# Patient Record
Sex: MALE | Race: WHITE | Employment: OTHER | URBAN - METROPOLITAN AREA
[De-identification: names, ages, dates, MRNs, and addresses within clinical notes are randomized per-mention and may not be internally consistent; named-entity substitution may affect disease eponyms.]

---

## 2017-01-17 ENCOUNTER — HOSPITAL ENCOUNTER (OUTPATIENT)
Dept: PHYSICAL THERAPY | Age: 52
Discharge: HOME | End: 2017-01-17
Payer: COMMERCIAL

## 2017-01-17 PROCEDURE — 97035 APP MDLTY 1+ULTRASOUND EA 15: CPT

## 2017-01-17 PROCEDURE — 97014 ELECTRIC STIMULATION THERAPY: CPT

## 2017-01-17 PROCEDURE — 97162 PT EVAL MOD COMPLEX 30 MIN: CPT
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
## 2017-01-18 NOTE — PROGRESS NOTES
Ambulatory/Rehab Services H2 Model Falls Risk Assessment    Risk Factor Pts. ·   Confusion/Disorientation/Impulsivity  []    4 ·   Symptomatic Depression  []   2 ·   Altered Elimination  []   1 ·   Dizziness/Vertigo  []   1 ·   Gender (Male)  [x]   1 ·   Any administered antiepileptics (anticonvulsants):  []   2 ·   Any administered benzodiazepines:  []   1 ·   Visual Impairment (specify):  []   1 ·   Portable Oxygen Use  []   1 ·   Orthostatic ? BP  []   1 ·   History of Recent Falls (within 3 mos.)  []   5     Ability to Rise from Chair (choose one) Pts. ·   Ability to rise in a single movement  []   0 ·   Pushes up, successful in one attempt  [x]   1 ·   Multiple attempts, but successful  []   3 ·   Unable to rise without assistance  []   4   Total: (5 or greater = High Risk) 2     Falls Prevention Plan:   []                Physical Limitations to Exercise (specify):   []                Mobility Assistance Device (type):   []                Exercise/Equipment Adaptation (specify):    ©2010 [facility] [address] Patent #[id number].  Federal Law prohibits the replication, distribution or use without written permission from [facility] [employer]

## 2017-01-19 ENCOUNTER — HOSPITAL ENCOUNTER (OUTPATIENT)
Dept: PHYSICAL THERAPY | Age: 52
Discharge: HOME | End: 2017-01-19
Payer: COMMERCIAL

## 2017-01-19 PROCEDURE — 97035 APP MDLTY 1+ULTRASOUND EA 15: CPT

## 2017-01-19 PROCEDURE — 97110 THERAPEUTIC EXERCISES: CPT

## 2017-01-19 PROCEDURE — 97014 ELECTRIC STIMULATION THERAPY: CPT
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
## 2017-01-24 ENCOUNTER — APPOINTMENT (OUTPATIENT)
Dept: PHYSICAL THERAPY | Age: 52
End: 2017-01-24
Payer: COMMERCIAL

## 2017-01-26 ENCOUNTER — HOSPITAL ENCOUNTER (OUTPATIENT)
Dept: PHYSICAL THERAPY | Age: 52
Discharge: HOME | End: 2017-01-26
Payer: COMMERCIAL

## 2017-01-26 PROCEDURE — 97110 THERAPEUTIC EXERCISES: CPT

## 2017-01-26 PROCEDURE — 97035 APP MDLTY 1+ULTRASOUND EA 15: CPT
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
## 2017-01-31 ENCOUNTER — HOSPITAL ENCOUNTER (OUTPATIENT)
Dept: PHYSICAL THERAPY | Age: 52
Discharge: HOME | End: 2017-01-31
Payer: COMMERCIAL

## 2017-01-31 PROCEDURE — 97140 MANUAL THERAPY 1/> REGIONS: CPT

## 2017-01-31 PROCEDURE — 97014 ELECTRIC STIMULATION THERAPY: CPT

## 2017-01-31 PROCEDURE — 97035 APP MDLTY 1+ULTRASOUND EA 15: CPT

## 2017-01-31 PROCEDURE — 97110 THERAPEUTIC EXERCISES: CPT
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
## 2017-02-02 ENCOUNTER — HOSPITAL ENCOUNTER (OUTPATIENT)
Dept: PHYSICAL THERAPY | Age: 52
Discharge: HOME | End: 2017-02-02
Payer: COMMERCIAL

## 2017-02-02 PROCEDURE — 97014 ELECTRIC STIMULATION THERAPY: CPT

## 2017-02-02 PROCEDURE — 97140 MANUAL THERAPY 1/> REGIONS: CPT

## 2017-02-02 PROCEDURE — 97110 THERAPEUTIC EXERCISES: CPT
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
## 2017-02-07 ENCOUNTER — HOSPITAL ENCOUNTER (OUTPATIENT)
Dept: PHYSICAL THERAPY | Age: 52
Discharge: HOME | End: 2017-02-07
Payer: COMMERCIAL

## 2017-02-07 PROCEDURE — 97035 APP MDLTY 1+ULTRASOUND EA 15: CPT

## 2017-02-07 PROCEDURE — 97140 MANUAL THERAPY 1/> REGIONS: CPT

## 2017-02-07 PROCEDURE — 97110 THERAPEUTIC EXERCISES: CPT

## 2017-02-07 PROCEDURE — 97014 ELECTRIC STIMULATION THERAPY: CPT
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
## 2017-02-09 ENCOUNTER — HOSPITAL ENCOUNTER (OUTPATIENT)
Dept: PHYSICAL THERAPY | Age: 52
Discharge: HOME | End: 2017-02-09
Payer: COMMERCIAL

## 2017-02-09 PROCEDURE — 97140 MANUAL THERAPY 1/> REGIONS: CPT

## 2017-02-09 PROCEDURE — 97014 ELECTRIC STIMULATION THERAPY: CPT

## 2017-02-09 PROCEDURE — 97110 THERAPEUTIC EXERCISES: CPT
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
## 2017-02-14 ENCOUNTER — HOSPITAL ENCOUNTER (OUTPATIENT)
Dept: PHYSICAL THERAPY | Age: 52
Discharge: HOME | End: 2017-02-14
Payer: COMMERCIAL

## 2017-02-16 ENCOUNTER — HOSPITAL ENCOUNTER (OUTPATIENT)
Dept: PHYSICAL THERAPY | Age: 52
Discharge: HOME | End: 2017-02-16
Payer: COMMERCIAL

## 2017-02-16 PROCEDURE — 97014 ELECTRIC STIMULATION THERAPY: CPT

## 2017-02-16 PROCEDURE — 97140 MANUAL THERAPY 1/> REGIONS: CPT

## 2017-02-16 PROCEDURE — 97110 THERAPEUTIC EXERCISES: CPT
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
## 2017-02-21 ENCOUNTER — APPOINTMENT (OUTPATIENT)
Dept: PHYSICAL THERAPY | Age: 52
End: 2017-02-21
Payer: COMMERCIAL

## 2017-02-23 ENCOUNTER — HOSPITAL ENCOUNTER (OUTPATIENT)
Dept: PHYSICAL THERAPY | Age: 52
Discharge: HOME | End: 2017-02-23
Payer: COMMERCIAL

## 2017-02-23 PROCEDURE — 97014 ELECTRIC STIMULATION THERAPY: CPT

## 2017-02-23 PROCEDURE — 97110 THERAPEUTIC EXERCISES: CPT

## 2017-02-23 PROCEDURE — 97035 APP MDLTY 1+ULTRASOUND EA 15: CPT
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
## 2017-02-28 ENCOUNTER — HOSPITAL ENCOUNTER (OUTPATIENT)
Dept: PHYSICAL THERAPY | Age: 52
Discharge: HOME | End: 2017-02-28
Payer: COMMERCIAL

## 2017-02-28 PROCEDURE — 97140 MANUAL THERAPY 1/> REGIONS: CPT

## 2017-02-28 PROCEDURE — 97014 ELECTRIC STIMULATION THERAPY: CPT

## 2017-02-28 PROCEDURE — 97110 THERAPEUTIC EXERCISES: CPT
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
## 2017-05-18 ENCOUNTER — APPOINTMENT (RX ONLY)
Dept: URBAN - METROPOLITAN AREA CLINIC 349 | Facility: CLINIC | Age: 52
Setting detail: DERMATOLOGY
End: 2017-05-18

## 2017-05-18 DIAGNOSIS — L81.4 OTHER MELANIN HYPERPIGMENTATION: ICD-10-CM | Status: STABLE

## 2017-05-18 DIAGNOSIS — B35.4 TINEA CORPORIS: ICD-10-CM | Status: INADEQUATELY CONTROLLED

## 2017-05-18 PROCEDURE — ? TREATMENT REGIMEN

## 2017-05-18 PROCEDURE — 99243 OFF/OP CNSLTJ NEW/EST LOW 30: CPT

## 2017-05-18 PROCEDURE — ? COUNSELING

## 2017-05-18 PROCEDURE — ? PRESCRIPTION

## 2017-05-18 RX ORDER — KETOCONAZOLE 20.5 MG/ML
SHAMPOO, SUSPENSION TOPICAL
Qty: 1 | Refills: 0 | Status: ERX | COMMUNITY
Start: 2017-05-18

## 2017-05-18 RX ORDER — TERBINAFINE HCL 250 MG
TABLET ORAL
Qty: 21 | Refills: 0 | Status: ERX | COMMUNITY
Start: 2017-05-18

## 2017-05-18 RX ADMIN — Medication: at 17:47

## 2017-05-18 RX ADMIN — KETOCONAZOLE: 20.5 SHAMPOO, SUSPENSION TOPICAL at 17:47

## 2017-05-18 ASSESSMENT — LOCATION DETAILED DESCRIPTION DERM
LOCATION DETAILED: RIGHT LATERAL ABDOMEN
LOCATION DETAILED: RIGHT SUPERIOR MEDIAL UPPER BACK
LOCATION DETAILED: LEFT POSTERIOR SHOULDER
LOCATION DETAILED: LEFT RIB CAGE
LOCATION DETAILED: RIGHT INFERIOR UPPER BACK
LOCATION DETAILED: RIGHT LATERAL UPPER BACK

## 2017-05-18 ASSESSMENT — LOCATION SIMPLE DESCRIPTION DERM
LOCATION SIMPLE: RIGHT UPPER BACK
LOCATION SIMPLE: LEFT SHOULDER
LOCATION SIMPLE: ABDOMEN

## 2017-05-18 ASSESSMENT — LOCATION ZONE DERM
LOCATION ZONE: TRUNK
LOCATION ZONE: ARM

## 2017-05-18 NOTE — PROCEDURE: TREATMENT REGIMEN
Initiate Treatment: Terbinafine 250 mg qd for 3 weeks, apply Ketoconazole  .1 % shampoo qd to torso daily for two weeks, then twice weekly there after.
Detail Level: Zone

## 2017-06-02 PROBLEM — R26.9 ABNORMALITY OF GAIT: Status: ACTIVE | Noted: 2017-06-02

## 2017-06-02 PROBLEM — M54.40 ACUTE LEFT-SIDED LOW BACK PAIN WITH SCIATICA: Status: ACTIVE | Noted: 2017-06-02

## 2017-06-02 PROBLEM — R29.898 WEAKNESS OF LEFT LEG: Status: ACTIVE | Noted: 2017-06-02

## 2017-06-02 PROBLEM — R32 URINARY INCONTINENCE: Status: ACTIVE | Noted: 2017-06-02

## 2017-06-21 ENCOUNTER — APPOINTMENT (RX ONLY)
Dept: URBAN - METROPOLITAN AREA CLINIC 349 | Facility: CLINIC | Age: 52
Setting detail: DERMATOLOGY
End: 2017-06-21

## 2017-06-21 DIAGNOSIS — B35.4 TINEA CORPORIS: ICD-10-CM | Status: IMPROVED

## 2017-06-21 DIAGNOSIS — L74.0 MILIARIA RUBRA: ICD-10-CM

## 2017-06-21 PROBLEM — L23.7 ALLERGIC CONTACT DERMATITIS DUE TO PLANTS, EXCEPT FOOD: Status: ACTIVE | Noted: 2017-06-21

## 2017-06-21 PROBLEM — L70.0 ACNE VULGARIS: Status: ACTIVE | Noted: 2017-06-21

## 2017-06-21 PROCEDURE — ? PRESCRIPTION

## 2017-06-21 PROCEDURE — ? COUNSELING

## 2017-06-21 PROCEDURE — ? TREATMENT REGIMEN

## 2017-06-21 PROCEDURE — 99213 OFFICE O/P EST LOW 20 MIN: CPT

## 2017-06-21 RX ORDER — MOMETASONE FUROATE 1 MG/G
CREAM TOPICAL
Qty: 1 | Refills: 2 | Status: ERX | COMMUNITY
Start: 2017-06-21

## 2017-06-21 RX ORDER — TERBINAFINE HCL 250 MG
TABLET ORAL
Qty: 14 | Refills: 0 | Status: ERX

## 2017-06-21 RX ADMIN — MOMETASONE FUROATE: 1 CREAM TOPICAL at 19:32

## 2017-06-21 ASSESSMENT — LOCATION SIMPLE DESCRIPTION DERM
LOCATION SIMPLE: LEFT UPPER BACK
LOCATION SIMPLE: RIGHT UPPER BACK

## 2017-06-21 ASSESSMENT — LOCATION ZONE DERM: LOCATION ZONE: TRUNK

## 2017-06-21 ASSESSMENT — LOCATION DETAILED DESCRIPTION DERM
LOCATION DETAILED: LEFT SUPERIOR UPPER BACK
LOCATION DETAILED: RIGHT MID-UPPER BACK

## 2017-06-21 NOTE — PROCEDURE: TREATMENT REGIMEN
Detail Level: Zone
Initiate Treatment: Use daily on affected area on back daily after bathing
Continue Regimen: Current regimen of Ketoconazole shampoo daily as body wash.  Continuing Terbinafin 250 mgs taking one daily for 2 more weeks

## 2017-12-11 PROBLEM — R25.1 TREMOR OF BOTH HANDS: Status: ACTIVE | Noted: 2017-12-11

## 2017-12-11 PROBLEM — R53.83 FATIGUE: Status: ACTIVE | Noted: 2017-12-11

## 2017-12-11 PROBLEM — Z82.0 FAMILY HISTORY OF PARKINSON DISEASE: Status: ACTIVE | Noted: 2017-12-11

## 2017-12-11 PROBLEM — R40.4 TRANSIENT ALTERATION OF AWARENESS: Status: ACTIVE | Noted: 2017-12-11

## 2017-12-14 PROBLEM — R41.3 MEMORY CHANGES: Status: ACTIVE | Noted: 2017-12-14

## 2019-02-18 ENCOUNTER — HOSPITAL ENCOUNTER (OUTPATIENT)
Dept: GENERAL RADIOLOGY | Age: 54
Discharge: HOME | End: 2019-02-18
Payer: COMMERCIAL

## 2019-02-18 DIAGNOSIS — R05.9 COUGH: ICD-10-CM

## 2019-02-18 DIAGNOSIS — J11.1 INFLUENZA: ICD-10-CM

## 2019-02-18 PROCEDURE — 71046 X-RAY EXAM CHEST 2 VIEWS: CPT

## 2022-03-18 PROBLEM — R25.1 TREMOR OF BOTH HANDS: Status: ACTIVE | Noted: 2017-12-11

## 2022-03-19 PROBLEM — R29.898 WEAKNESS OF LEFT LEG: Status: ACTIVE | Noted: 2017-06-02

## 2022-03-19 PROBLEM — R40.4 TRANSIENT ALTERATION OF AWARENESS: Status: ACTIVE | Noted: 2017-12-11

## 2022-03-19 PROBLEM — R32 URINARY INCONTINENCE: Status: ACTIVE | Noted: 2017-06-02

## 2022-03-19 PROBLEM — R26.9 ABNORMALITY OF GAIT: Status: ACTIVE | Noted: 2017-06-02

## 2022-03-19 PROBLEM — M54.40 ACUTE LEFT-SIDED LOW BACK PAIN WITH SCIATICA: Status: ACTIVE | Noted: 2017-06-02

## 2022-03-19 PROBLEM — R41.3 MEMORY CHANGES: Status: ACTIVE | Noted: 2017-12-14

## 2022-03-19 PROBLEM — R53.83 FATIGUE: Status: ACTIVE | Noted: 2017-12-11

## 2022-03-19 PROBLEM — Z82.0 FAMILY HISTORY OF PARKINSON DISEASE: Status: ACTIVE | Noted: 2017-12-11

## 2022-06-02 ENCOUNTER — TELEPHONE (OUTPATIENT)
Dept: FAMILY MEDICINE CLINIC | Facility: CLINIC | Age: 57
End: 2022-06-02

## 2022-06-02 NOTE — TELEPHONE ENCOUNTER
----- Message from Binu Davis sent at 6/2/2022  9:25 AM EDT -----  Subject: Appointment Request    Reason for Call: Semi-Routine Skin Problem    QUESTIONS  Type of Appointment? Established Patient  Reason for appointment request? Available appointments did not meet   patient need  Additional Information for Provider? Pt has a sore on the bottom of his   right foot, Pt would like to be seen in-person not VV. Pt will schedule   with any physician. Please reach out if Pt can be seen within the next   couple of days. ---------------------------------------------------------------------------  --------------  Ascencion Anna INFO  What is the best way for the office to contact you? OK to leave message on   voicemail  Preferred Call Back Phone Number? [phone]  ---------------------------------------------------------------------------  --------------  SCRIPT ANSWERS  Relationship to Patient? Self  Are you having swelling in your throat or face? No  Are you having difficulty breathing? No  Have the symptoms worsened or spread in the last day? No  Are you having fevers (100.4), chills or sweats? No  Have you recently (14 days) seen a provider for this issue? No  Have you been diagnosed with, awaiting test results for, or told that you   are suspected of having COVID-19 (Coronavirus)? (If patient has tested   negative or was tested as a requirement for work, school, or travel and   not based on symptoms, answer no)? No  Within the past 10 days have you developed any of the following symptoms   (answer no if symptoms have been present longer than 10 days or began   more than 10 days ago)? Fever or Chills, Cough, Shortness of breath or   difficulty breathing, Loss of taste or smell, Sore throat, Nasal   congestion, Sneezing or runny nose, Fatigue or generalized body aches   (answer no if pain is specific to a body part e.g. back pain), Diarrhea,   Headache?  No  Have you had close contact with someone with COVID-19 in the last 7 days? No  (Service Expert  click yes below to proceed with [employer] As Usual   Scheduling)?  Yes

## 2023-08-30 ENCOUNTER — OFFICE VISIT (OUTPATIENT)
Dept: FAMILY MEDICINE CLINIC | Facility: CLINIC | Age: 58
End: 2023-08-30
Payer: COMMERCIAL

## 2023-08-30 VITALS
TEMPERATURE: 97.2 F | HEIGHT: 71 IN | OXYGEN SATURATION: 98 % | WEIGHT: 193.25 LBS | BODY MASS INDEX: 27.06 KG/M2 | SYSTOLIC BLOOD PRESSURE: 130 MMHG | DIASTOLIC BLOOD PRESSURE: 80 MMHG | HEART RATE: 59 BPM

## 2023-08-30 DIAGNOSIS — J40 BRONCHITIS: Primary | ICD-10-CM

## 2023-08-30 PROCEDURE — 99213 OFFICE O/P EST LOW 20 MIN: CPT | Performed by: NURSE PRACTITIONER

## 2023-08-30 RX ORDER — METHYLPREDNISOLONE 4 MG/1
TABLET ORAL
Qty: 1 KIT | Refills: 0 | Status: SHIPPED | OUTPATIENT
Start: 2023-08-30 | End: 2023-09-05

## 2023-08-30 RX ORDER — AZITHROMYCIN 250 MG/1
250 TABLET, FILM COATED ORAL SEE ADMIN INSTRUCTIONS
Qty: 6 TABLET | Refills: 0 | Status: SHIPPED | OUTPATIENT
Start: 2023-08-30 | End: 2023-09-04

## 2023-08-30 RX ORDER — ALBUTEROL SULFATE 90 UG/1
2 AEROSOL, METERED RESPIRATORY (INHALATION) 4 TIMES DAILY PRN
Qty: 1 EACH | Refills: 0 | Status: SHIPPED | OUTPATIENT
Start: 2023-08-30

## 2023-08-30 SDOH — ECONOMIC STABILITY: FOOD INSECURITY: WITHIN THE PAST 12 MONTHS, THE FOOD YOU BOUGHT JUST DIDN'T LAST AND YOU DIDN'T HAVE MONEY TO GET MORE.: NEVER TRUE

## 2023-08-30 SDOH — ECONOMIC STABILITY: FOOD INSECURITY: WITHIN THE PAST 12 MONTHS, YOU WORRIED THAT YOUR FOOD WOULD RUN OUT BEFORE YOU GOT MONEY TO BUY MORE.: NEVER TRUE

## 2023-08-30 SDOH — ECONOMIC STABILITY: INCOME INSECURITY: HOW HARD IS IT FOR YOU TO PAY FOR THE VERY BASICS LIKE FOOD, HOUSING, MEDICAL CARE, AND HEATING?: NOT HARD AT ALL

## 2023-08-30 SDOH — ECONOMIC STABILITY: HOUSING INSECURITY
IN THE LAST 12 MONTHS, WAS THERE A TIME WHEN YOU DID NOT HAVE A STEADY PLACE TO SLEEP OR SLEPT IN A SHELTER (INCLUDING NOW)?: NO

## 2023-08-30 ASSESSMENT — ENCOUNTER SYMPTOMS
COUGH: 1
HEMOPTYSIS: 0
SHORTNESS OF BREATH: 1
HEARTBURN: 0
WHEEZING: 1

## 2023-08-30 ASSESSMENT — PATIENT HEALTH QUESTIONNAIRE - PHQ9
SUM OF ALL RESPONSES TO PHQ QUESTIONS 1-9: 0
SUM OF ALL RESPONSES TO PHQ QUESTIONS 1-9: 0
1. LITTLE INTEREST OR PLEASURE IN DOING THINGS: 0
2. FEELING DOWN, DEPRESSED OR HOPELESS: 0
SUM OF ALL RESPONSES TO PHQ QUESTIONS 1-9: 0
SUM OF ALL RESPONSES TO PHQ QUESTIONS 1-9: 0
SUM OF ALL RESPONSES TO PHQ9 QUESTIONS 1 & 2: 0

## 2023-08-30 NOTE — PROGRESS NOTES
[address], [address] [place] [address]  Phone [phone]  Fax:  [phone]    Patient: Serina Dill  YOB: 1965  Patient Age 66 y.o. Patient sex: male  Medical Record:  [id number]  Visit Date: 08/30/23    [facility] Clinic Note     Chief Complaint   Patient presents with    Wheezing     SOB, prod cough, head congestion       History of Present Illness:  Mr. Britany Benito is a pleasant 66year old male with a past medical history as noted below who presents for an acute visit. Patient states that he has been experiencing productive cough, nasal congestion, wheezing, shortness of breath that started approximately 2 months ago. Symptoms have been waxing and waning. He denies fever or chills. No myalgias. No bloody nasal discharge. Has had occasional shortness of breath, night time wheezing, no hemoptysis. Denies any sick contacts. Denies recent travel. Is a retired . Cough  This is a new problem. The current episode started more than 1 month ago. The problem has been waxing and waning. The problem occurs every few minutes. The cough is Productive of sputum. Associated symptoms include nasal congestion, postnasal drip, shortness of breath and wheezing. Pertinent negatives include no chest pain, chills, ear congestion, ear pain, fever, headaches, heartburn, hemoptysis or myalgias. Nothing aggravates the symptoms. He has tried OTC cough suppressant for the symptoms. The treatment provided mild relief. Allergies:   Allergies   Allergen Reactions    Penicillins Rash       Current Medications:   Medications marked \"taking\" at this time:    Current Outpatient Medications:     albuterol sulfate HFA (VENTOLIN HFA) 108 (90 Base) MCG/ACT inhaler, Inhale 2 puffs into the lungs 4 times daily as needed for Wheezing, Disp: 1 each, Rfl: 0    methylPREDNISolone (MEDROL DOSEPACK) 4 MG tablet, Take by mouth., Disp: 1 kit, Rfl: 0    azithromycin (ZITHROMAX) 250

## 2023-09-27 ENCOUNTER — TELEPHONE (OUTPATIENT)
Dept: FAMILY MEDICINE CLINIC | Facility: CLINIC | Age: 58
End: 2023-09-27

## 2023-09-27 DIAGNOSIS — R05.3 CHRONIC COUGH: Primary | ICD-10-CM

## 2023-09-27 NOTE — TELEPHONE ENCOUNTER
Pt would like a referral to a pulmonologist.   Pt was seen on 08/30 by Chriss Earl. Pt was given an antibiotic and   steroids. Pt began feeling bad again and went to [facility] urgent care in   [address] [place] [address] on 09/24. They are recommending pt see a pulmonologist.   Please call pt with information. please call wife Abner Givens she is on the   DENIS.

## 2023-10-20 ENCOUNTER — OFFICE VISIT (OUTPATIENT)
Dept: PULMONOLOGY | Age: 58
End: 2023-10-20
Payer: COMMERCIAL

## 2023-10-20 VITALS
HEIGHT: 71 IN | SYSTOLIC BLOOD PRESSURE: 132 MMHG | HEART RATE: 103 BPM | DIASTOLIC BLOOD PRESSURE: 84 MMHG | WEIGHT: 187 LBS | BODY MASS INDEX: 26.18 KG/M2 | RESPIRATION RATE: 18 BRPM | OXYGEN SATURATION: 98 % | TEMPERATURE: 97.3 F

## 2023-10-20 DIAGNOSIS — R06.2 WHEEZING: ICD-10-CM

## 2023-10-20 DIAGNOSIS — R05.3 CHRONIC COUGH: Primary | ICD-10-CM

## 2023-10-20 DIAGNOSIS — R63.4 UNINTENTIONAL WEIGHT LOSS: ICD-10-CM

## 2023-10-20 PROCEDURE — 99204 OFFICE O/P NEW MOD 45 MIN: CPT | Performed by: NURSE PRACTITIONER

## 2023-10-20 RX ORDER — PREDNISONE 10 MG/1
TABLET ORAL
Qty: 30 TABLET | Refills: 0 | Status: SHIPPED | OUTPATIENT
Start: 2023-10-20

## 2023-10-20 RX ORDER — BENZONATATE 200 MG/1
200 CAPSULE ORAL 3 TIMES DAILY PRN
Qty: 90 CAPSULE | Refills: 3 | Status: SHIPPED | OUTPATIENT
Start: 2023-10-20

## 2023-10-20 RX ORDER — ALBUTEROL SULFATE 2.5 MG/3ML
2.5 SOLUTION RESPIRATORY (INHALATION) EVERY 6 HOURS PRN
Qty: 120 EACH | Refills: 3 | Status: SHIPPED | OUTPATIENT
Start: 2023-10-20

## 2023-10-20 RX ORDER — BUDESONIDE AND FORMOTEROL FUMARATE DIHYDRATE 160; 4.5 UG/1; UG/1
2 AEROSOL RESPIRATORY (INHALATION) 2 TIMES DAILY
Qty: 1 EACH | Refills: 11 | Status: SHIPPED | OUTPATIENT
Start: 2023-10-20

## 2023-10-20 RX ORDER — CETIRIZINE HYDROCHLORIDE 10 MG/1
10 TABLET ORAL DAILY
COMMUNITY
Start: 2023-09-24

## 2023-10-30 ENCOUNTER — HOSPITAL ENCOUNTER (OUTPATIENT)
Dept: CT IMAGING | Age: 58
Discharge: HOME | End: 2023-11-02
Payer: COMMERCIAL

## 2023-10-30 DIAGNOSIS — R05.3 CHRONIC COUGH: ICD-10-CM

## 2023-10-30 PROCEDURE — 71250 CT THORAX DX C-: CPT

## 2023-11-13 ENCOUNTER — APPOINTMENT (OUTPATIENT)
Dept: CT IMAGING | Age: 58
End: 2023-11-13
Payer: COMMERCIAL

## 2023-11-13 ENCOUNTER — HOSPITAL ENCOUNTER (OUTPATIENT)
Age: 58
Setting detail: OBSERVATION
Discharge: HOME | End: 2023-11-15
Attending: EMERGENCY MEDICINE | Admitting: HOSPITALIST
Payer: COMMERCIAL

## 2023-11-13 ENCOUNTER — APPOINTMENT (OUTPATIENT)
Dept: GENERAL RADIOLOGY | Age: 58
End: 2023-11-13
Payer: COMMERCIAL

## 2023-11-13 DIAGNOSIS — R06.02 SHORTNESS OF BREATH: ICD-10-CM

## 2023-11-13 DIAGNOSIS — R06.2 WHEEZING: Primary | ICD-10-CM

## 2023-11-13 DIAGNOSIS — R05.2 SUBACUTE COUGH: ICD-10-CM

## 2023-11-13 PROBLEM — R05.3 CHRONIC COUGH: Status: ACTIVE | Noted: 2023-11-13

## 2023-11-13 PROBLEM — R07.9 CHEST PAIN: Status: ACTIVE | Noted: 2023-11-13

## 2023-11-13 LAB
ALBUMIN SERPL-MCNC: 4.2 G/DL (ref 3.5–5)
ALBUMIN/GLOB SERPL: 1.3 (ref 0.4–1.6)
ALP SERPL-CCNC: 73 U/L (ref 50–136)
ALT SERPL-CCNC: 50 U/L (ref 12–65)
ANION GAP SERPL CALC-SCNC: 5 MMOL/L (ref 2–11)
AST SERPL-CCNC: 23 U/L (ref 15–37)
B PERT DNA SPEC QL NAA+PROBE: NOT DETECTED
BASOPHILS # BLD: 0 K/UL (ref 0–0.2)
BASOPHILS NFR BLD: 1 % (ref 0–2)
BILIRUB SERPL-MCNC: 0.3 MG/DL (ref 0.2–1.1)
BORDETELLA PARAPERTUSSIS BY PCR: NOT DETECTED
BUN SERPL-MCNC: 14 MG/DL (ref 6–23)
C PNEUM DNA SPEC QL NAA+PROBE: NOT DETECTED
CALCIUM SERPL-MCNC: 9.2 MG/DL (ref 8.3–10.4)
CHLORIDE SERPL-SCNC: 105 MMOL/L (ref 101–110)
CO2 SERPL-SCNC: 30 MMOL/L (ref 21–32)
CREAT SERPL-MCNC: 1.2 MG/DL (ref 0.8–1.5)
DIFFERENTIAL METHOD BLD: ABNORMAL
EKG ATRIAL RATE: 79 BPM
EKG DIAGNOSIS: NORMAL
EKG P AXIS: 81 DEGREES
EKG P-R INTERVAL: 148 MS
EKG Q-T INTERVAL: 346 MS
EKG QRS DURATION: 84 MS
EKG QTC CALCULATION (BAZETT): 396 MS
EKG R AXIS: 71 DEGREES
EKG T AXIS: 62 DEGREES
EKG VENTRICULAR RATE: 79 BPM
EOSINOPHIL # BLD: 0.5 K/UL (ref 0–0.8)
EOSINOPHIL NFR BLD: 11 % (ref 0.5–7.8)
ERYTHROCYTE [DISTWIDTH] IN BLOOD BY AUTOMATED COUNT: 14.6 % (ref 11.9–14.6)
FLUAV SUBTYP SPEC NAA+PROBE: NOT DETECTED
FLUBV RNA SPEC QL NAA+PROBE: NOT DETECTED
GLOBULIN SER CALC-MCNC: 3.2 G/DL (ref 2.8–4.5)
GLUCOSE SERPL-MCNC: 94 MG/DL (ref 65–100)
HADV DNA SPEC QL NAA+PROBE: NOT DETECTED
HCOV 229E RNA SPEC QL NAA+PROBE: NOT DETECTED
HCOV HKU1 RNA SPEC QL NAA+PROBE: NOT DETECTED
HCOV NL63 RNA SPEC QL NAA+PROBE: NOT DETECTED
HCOV OC43 RNA SPEC QL NAA+PROBE: NOT DETECTED
HCT VFR BLD AUTO: 47.3 % (ref 41.1–50.3)
HGB BLD-MCNC: 15.9 G/DL (ref 13.6–17.2)
HMPV RNA SPEC QL NAA+PROBE: NOT DETECTED
HPIV1 RNA SPEC QL NAA+PROBE: NOT DETECTED
HPIV2 RNA SPEC QL NAA+PROBE: NOT DETECTED
HPIV3 RNA SPEC QL NAA+PROBE: NOT DETECTED
HPIV4 RNA SPEC QL NAA+PROBE: NOT DETECTED
IMM GRANULOCYTES # BLD AUTO: 0 K/UL (ref 0–0.5)
IMM GRANULOCYTES NFR BLD AUTO: 0 % (ref 0–5)
LACTATE SERPL-SCNC: 1.5 MMOL/L (ref 0.4–2)
LYMPHOCYTES # BLD: 1.2 K/UL (ref 0.5–4.6)
LYMPHOCYTES NFR BLD: 24 % (ref 13–44)
M PNEUMO DNA SPEC QL NAA+PROBE: NOT DETECTED
MAGNESIUM SERPL-MCNC: 2.6 MG/DL (ref 1.8–2.4)
MCH RBC QN AUTO: 30.1 PG (ref 26.1–32.9)
MCHC RBC AUTO-ENTMCNC: 33.6 G/DL (ref 31.4–35)
MCV RBC AUTO: 89.4 FL (ref 82–102)
MONOCYTES # BLD: 0.3 K/UL (ref 0.1–1.3)
MONOCYTES NFR BLD: 6 % (ref 4–12)
NEUTS SEG # BLD: 2.9 K/UL (ref 1.7–8.2)
NEUTS SEG NFR BLD: 58 % (ref 43–78)
NRBC # BLD: 0 K/UL (ref 0–0.2)
PLATELET # BLD AUTO: 244 K/UL (ref 150–450)
PMV BLD AUTO: 9.9 FL (ref 9.4–12.3)
POTASSIUM SERPL-SCNC: 3.9 MMOL/L (ref 3.5–5.1)
PROCALCITONIN SERPL-MCNC: <0.05 NG/ML (ref 0–0.49)
PROT SERPL-MCNC: 7.4 G/DL (ref 6.3–8.2)
RBC # BLD AUTO: 5.29 M/UL (ref 4.23–5.6)
RSV RNA SPEC QL NAA+PROBE: NOT DETECTED
RV+EV RNA SPEC QL NAA+PROBE: NOT DETECTED
SARS-COV-2 RNA RESP QL NAA+PROBE: NOT DETECTED
SODIUM SERPL-SCNC: 140 MMOL/L (ref 133–143)
WBC # BLD AUTO: 5 K/UL (ref 4.3–11.1)

## 2023-11-13 PROCEDURE — 71260 CT THORAX DX C+: CPT

## 2023-11-13 PROCEDURE — 93010 ELECTROCARDIOGRAM REPORT: CPT | Performed by: INTERNAL MEDICINE

## 2023-11-13 PROCEDURE — 84145 PROCALCITONIN (PCT): CPT

## 2023-11-13 PROCEDURE — 71045 X-RAY EXAM CHEST 1 VIEW: CPT

## 2023-11-13 PROCEDURE — 84484 ASSAY OF TROPONIN QUANT: CPT

## 2023-11-13 PROCEDURE — 6360000004 HC RX CONTRAST MEDICATION: Performed by: EMERGENCY MEDICINE

## 2023-11-13 PROCEDURE — 83605 ASSAY OF LACTIC ACID: CPT

## 2023-11-13 PROCEDURE — 96374 THER/PROPH/DIAG INJ IV PUSH: CPT

## 2023-11-13 PROCEDURE — 85025 COMPLETE CBC W/AUTO DIFF WBC: CPT

## 2023-11-13 PROCEDURE — 99285 EMERGENCY DEPT VISIT HI MDM: CPT

## 2023-11-13 PROCEDURE — 0202U NFCT DS 22 TRGT SARS-COV-2: CPT

## 2023-11-13 PROCEDURE — 80053 COMPREHEN METABOLIC PANEL: CPT

## 2023-11-13 PROCEDURE — 94640 AIRWAY INHALATION TREATMENT: CPT

## 2023-11-13 PROCEDURE — 6360000002 HC RX W HCPCS: Performed by: EMERGENCY MEDICINE

## 2023-11-13 PROCEDURE — 93005 ELECTROCARDIOGRAM TRACING: CPT | Performed by: EMERGENCY MEDICINE

## 2023-11-13 PROCEDURE — 94644 CONT INHLJ TX 1ST HOUR: CPT

## 2023-11-13 PROCEDURE — 83735 ASSAY OF MAGNESIUM: CPT

## 2023-11-13 PROCEDURE — 6370000000 HC RX 637 (ALT 250 FOR IP): Performed by: EMERGENCY MEDICINE

## 2023-11-13 PROCEDURE — 87040 BLOOD CULTURE FOR BACTERIA: CPT

## 2023-11-13 RX ORDER — SODIUM CHLORIDE 0.9 % (FLUSH) 0.9 %
10 SYRINGE (ML) INJECTION
Status: DISCONTINUED | OUTPATIENT
Start: 2023-11-13 | End: 2023-11-15 | Stop reason: HOSPADM

## 2023-11-13 RX ORDER — 0.9 % SODIUM CHLORIDE 0.9 %
100 INTRAVENOUS SOLUTION INTRAVENOUS ONCE
Status: DISCONTINUED | OUTPATIENT
Start: 2023-11-13 | End: 2023-11-15 | Stop reason: HOSPADM

## 2023-11-13 RX ORDER — ALBUTEROL SULFATE 2.5 MG/3ML
10 SOLUTION RESPIRATORY (INHALATION) ONCE
Status: COMPLETED | OUTPATIENT
Start: 2023-11-13 | End: 2023-11-13

## 2023-11-13 RX ORDER — IPRATROPIUM BROMIDE AND ALBUTEROL SULFATE 2.5; .5 MG/3ML; MG/3ML
1 SOLUTION RESPIRATORY (INHALATION) ONCE
Status: COMPLETED | OUTPATIENT
Start: 2023-11-13 | End: 2023-11-13

## 2023-11-13 RX ADMIN — ALBUTEROL SULFATE 10 MG/HR: 2.5 SOLUTION RESPIRATORY (INHALATION) at 22:34

## 2023-11-13 RX ADMIN — METHYLPREDNISOLONE SODIUM SUCCINATE 125 MG: 125 INJECTION, POWDER, FOR SOLUTION INTRAMUSCULAR; INTRAVENOUS at 21:58

## 2023-11-13 RX ADMIN — IOPAMIDOL 100 ML: 755 INJECTION, SOLUTION INTRAVENOUS at 22:07

## 2023-11-13 RX ADMIN — IPRATROPIUM BROMIDE AND ALBUTEROL SULFATE 1 DOSE: 2.5; .5 SOLUTION RESPIRATORY (INHALATION) at 22:17

## 2023-11-13 ASSESSMENT — PAIN SCALES - GENERAL: PAINLEVEL_OUTOF10: 7

## 2023-11-13 ASSESSMENT — PAIN - FUNCTIONAL ASSESSMENT: PAIN_FUNCTIONAL_ASSESSMENT: 0-10

## 2023-11-14 ENCOUNTER — APPOINTMENT (OUTPATIENT)
Dept: NON INVASIVE DIAGNOSTICS | Age: 58
End: 2023-11-14
Attending: HOSPITALIST
Payer: COMMERCIAL

## 2023-11-14 PROBLEM — R06.2 WHEEZING: Status: ACTIVE | Noted: 2023-11-14

## 2023-11-14 PROBLEM — R05.2 SUBACUTE COUGH: Status: ACTIVE | Noted: 2023-11-13

## 2023-11-14 LAB
BASOPHILS # BLD: 0 K/UL (ref 0–0.2)
BASOPHILS NFR BLD: 0 % (ref 0–2)
DIFFERENTIAL METHOD BLD: ABNORMAL
ECHO AO ASC DIAM: 2.8 CM
ECHO AO ASCENDING AORTA INDEX: 1.37 CM/M2
ECHO AO ROOT DIAM: 2.8 CM
ECHO AO ROOT INDEX: 1.37 CM/M2
ECHO AV AREA PEAK VELOCITY: 3.6 CM2
ECHO AV AREA VTI: 4 CM2
ECHO AV AREA/BSA PEAK VELOCITY: 1.8 CM2/M2
ECHO AV AREA/BSA VTI: 2 CM2/M2
ECHO AV MEAN GRADIENT: 12 MMHG
ECHO AV MEAN VELOCITY: 1.7 M/S
ECHO AV PEAK GRADIENT: 16 MMHG
ECHO AV PEAK VELOCITY: 2 M/S
ECHO AV VELOCITY RATIO: 0.8
ECHO AV VTI: 34.6 CM
ECHO BSA: 2.09 M2
ECHO LA AREA 2C: 16.9 CM2
ECHO LA AREA 4C: 12 CM2
ECHO LA DIAMETER INDEX: 1.52 CM/M2
ECHO LA DIAMETER: 3.1 CM
ECHO LA MAJOR AXIS: 4.3 CM
ECHO LA MINOR AXIS: 4.6 CM
ECHO LA TO AORTIC ROOT RATIO: 1.11
ECHO LA VOL BP: 37 ML (ref 18–58)
ECHO LA VOL MOD A2C: 50 ML (ref 18–58)
ECHO LA VOL MOD A4C: 26 ML (ref 18–58)
ECHO LA VOL/BSA BIPLANE: 18 ML/M2 (ref 16–34)
ECHO LA VOLUME INDEX MOD A2C: 25 ML/M2 (ref 16–34)
ECHO LA VOLUME INDEX MOD A4C: 13 ML/M2 (ref 16–34)
ECHO LV E' LATERAL VELOCITY: 9 CM/S
ECHO LV E' SEPTAL VELOCITY: 10 CM/S
ECHO LV FRACTIONAL SHORTENING: 47 % (ref 28–44)
ECHO LV INTERNAL DIMENSION DIASTOLE INDEX: 2.11 CM/M2
ECHO LV INTERNAL DIMENSION DIASTOLIC: 4.3 CM (ref 4.2–5.9)
ECHO LV INTERNAL DIMENSION SYSTOLIC INDEX: 1.13 CM/M2
ECHO LV INTERNAL DIMENSION SYSTOLIC: 2.3 CM
ECHO LV IVSD: 1.3 CM (ref 0.6–1)
ECHO LV MASS 2D: 173.6 G (ref 88–224)
ECHO LV MASS INDEX 2D: 85.1 G/M2 (ref 49–115)
ECHO LV POSTERIOR WALL DIASTOLIC: 1 CM (ref 0.6–1)
ECHO LV RELATIVE WALL THICKNESS RATIO: 0.47
ECHO LVOT AREA: 4.5 CM2
ECHO LVOT AV VTI INDEX: 0.89
ECHO LVOT DIAM: 2.4 CM
ECHO LVOT MEAN GRADIENT: 6 MMHG
ECHO LVOT PEAK GRADIENT: 10 MMHG
ECHO LVOT PEAK VELOCITY: 1.6 M/S
ECHO LVOT STROKE VOLUME INDEX: 68.5 ML/M2
ECHO LVOT SV: 139.7 ML
ECHO LVOT VTI: 30.9 CM
ECHO MV A VELOCITY: 1.54 M/S
ECHO MV AREA VTI: 5.8 CM2
ECHO MV E DECELERATION TIME (DT): 143 MS
ECHO MV E VELOCITY: 1.01 M/S
ECHO MV E/A RATIO: 0.66
ECHO MV E/E' LATERAL: 11.22
ECHO MV E/E' RATIO (AVERAGED): 10.66
ECHO MV LVOT VTI INDEX: 0.77
ECHO MV MAX VELOCITY: 1.6 M/S
ECHO MV MEAN GRADIENT: 4 MMHG
ECHO MV MEAN VELOCITY: 1 M/S
ECHO MV PEAK GRADIENT: 10 MMHG
ECHO MV VTI: 23.9 CM
ECHO PV MAX VELOCITY: 1.6 M/S
ECHO PV PEAK GRADIENT: 10 MMHG
ECHO RV FREE WALL PEAK S': 38 CM/S
ECHO RV INTERNAL DIMENSION: 3.3 CM
ECHO RV TAPSE: 3.7 CM (ref 1.7–?)
ECHO TV REGURGITANT MAX VELOCITY: 3.01 M/S
ECHO TV REGURGITANT PEAK GRADIENT: 36 MMHG
EOSINOPHIL # BLD: 0 K/UL (ref 0–0.8)
EOSINOPHIL NFR BLD: 0 % (ref 0.5–7.8)
ERYTHROCYTE [DISTWIDTH] IN BLOOD BY AUTOMATED COUNT: 14.7 % (ref 11.9–14.6)
HCT VFR BLD AUTO: 44.2 % (ref 41.1–50.3)
HGB BLD-MCNC: 14.9 G/DL (ref 13.6–17.2)
IMM GRANULOCYTES # BLD AUTO: 0 K/UL (ref 0–0.5)
IMM GRANULOCYTES NFR BLD AUTO: 0 % (ref 0–5)
LYMPHOCYTES # BLD: 0.2 K/UL (ref 0.5–4.6)
LYMPHOCYTES NFR BLD: 4 % (ref 13–44)
MCH RBC QN AUTO: 30.3 PG (ref 26.1–32.9)
MCHC RBC AUTO-ENTMCNC: 33.7 G/DL (ref 31.4–35)
MCV RBC AUTO: 89.8 FL (ref 82–102)
MONOCYTES # BLD: 0 K/UL (ref 0.1–1.3)
MONOCYTES NFR BLD: 1 % (ref 4–12)
NEUTS SEG # BLD: 4.5 K/UL (ref 1.7–8.2)
NEUTS SEG NFR BLD: 95 % (ref 43–78)
NRBC # BLD: 0 K/UL (ref 0–0.2)
PLATELET # BLD AUTO: 251 K/UL (ref 150–450)
PLATELET COMMENT: ADEQUATE
PMV BLD AUTO: 10.8 FL (ref 9.4–12.3)
RBC # BLD AUTO: 4.92 M/UL (ref 4.23–5.6)
RBC MORPH BLD: ABNORMAL
TROPONIN I SERPL HS-MCNC: 15.1 PG/ML (ref 0–14)
TROPONIN I SERPL HS-MCNC: 17.8 PG/ML (ref 0–14)
WBC # BLD AUTO: 4.7 K/UL (ref 4.3–11.1)
WBC MORPH BLD: ABNORMAL

## 2023-11-14 PROCEDURE — 6360000002 HC RX W HCPCS: Performed by: PHYSICIAN ASSISTANT

## 2023-11-14 PROCEDURE — 83516 IMMUNOASSAY NONANTIBODY: CPT

## 2023-11-14 PROCEDURE — 6370000000 HC RX 637 (ALT 250 FOR IP): Performed by: STUDENT IN AN ORGANIZED HEALTH CARE EDUCATION/TRAINING PROGRAM

## 2023-11-14 PROCEDURE — G0378 HOSPITAL OBSERVATION PER HR: HCPCS

## 2023-11-14 PROCEDURE — 87206 SMEAR FLUORESCENT/ACID STAI: CPT

## 2023-11-14 PROCEDURE — 94640 AIRWAY INHALATION TREATMENT: CPT

## 2023-11-14 PROCEDURE — C8929 TTE W OR WO FOL WCON,DOPPLER: HCPCS

## 2023-11-14 PROCEDURE — 96376 TX/PRO/DX INJ SAME DRUG ADON: CPT

## 2023-11-14 PROCEDURE — 36415 COLL VENOUS BLD VENIPUNCTURE: CPT

## 2023-11-14 PROCEDURE — 87070 CULTURE OTHR SPECIMN AEROBIC: CPT

## 2023-11-14 PROCEDURE — 2580000003 HC RX 258: Performed by: INTERNAL MEDICINE

## 2023-11-14 PROCEDURE — 87116 MYCOBACTERIA CULTURE: CPT

## 2023-11-14 PROCEDURE — 93306 TTE W/DOPPLER COMPLETE: CPT | Performed by: INTERNAL MEDICINE

## 2023-11-14 PROCEDURE — 99223 1ST HOSP IP/OBS HIGH 75: CPT | Performed by: INTERNAL MEDICINE

## 2023-11-14 PROCEDURE — 94760 N-INVAS EAR/PLS OXIMETRY 1: CPT

## 2023-11-14 PROCEDURE — 6370000000 HC RX 637 (ALT 250 FOR IP): Performed by: PHYSICIAN ASSISTANT

## 2023-11-14 PROCEDURE — 6370000000 HC RX 637 (ALT 250 FOR IP): Performed by: HOSPITALIST

## 2023-11-14 PROCEDURE — 87205 SMEAR GRAM STAIN: CPT

## 2023-11-14 PROCEDURE — 94664 DEMO&/EVAL PT USE INHALER: CPT

## 2023-11-14 PROCEDURE — 2580000003 HC RX 258: Performed by: HOSPITALIST

## 2023-11-14 PROCEDURE — 85025 COMPLETE CBC W/AUTO DIFF WBC: CPT

## 2023-11-14 PROCEDURE — 86037 ANCA TITER EACH ANTIBODY: CPT

## 2023-11-14 PROCEDURE — 6360000002 HC RX W HCPCS: Performed by: HOSPITALIST

## 2023-11-14 PROCEDURE — 87040 BLOOD CULTURE FOR BACTERIA: CPT

## 2023-11-14 PROCEDURE — 2580000003 HC RX 258: Performed by: PHYSICIAN ASSISTANT

## 2023-11-14 PROCEDURE — 2700000000 HC OXYGEN THERAPY PER DAY

## 2023-11-14 PROCEDURE — 82785 ASSAY OF IGE: CPT

## 2023-11-14 PROCEDURE — 94761 N-INVAS EAR/PLS OXIMETRY MLT: CPT

## 2023-11-14 PROCEDURE — 6360000004 HC RX CONTRAST MEDICATION: Performed by: INTERNAL MEDICINE

## 2023-11-14 RX ORDER — FLUTICASONE PROPIONATE 50 MCG
2 SPRAY, SUSPENSION (ML) NASAL DAILY
Status: DISCONTINUED | OUTPATIENT
Start: 2023-11-14 | End: 2023-11-15 | Stop reason: HOSPADM

## 2023-11-14 RX ORDER — MAGNESIUM SULFATE IN WATER 40 MG/ML
2000 INJECTION, SOLUTION INTRAVENOUS PRN
Status: DISCONTINUED | OUTPATIENT
Start: 2023-11-14 | End: 2023-11-15 | Stop reason: HOSPADM

## 2023-11-14 RX ORDER — MONTELUKAST SODIUM 10 MG/1
10 TABLET ORAL NIGHTLY
Status: DISCONTINUED | OUTPATIENT
Start: 2023-11-14 | End: 2023-11-15 | Stop reason: HOSPADM

## 2023-11-14 RX ORDER — SODIUM CHLORIDE 0.9 % (FLUSH) 0.9 %
5-40 SYRINGE (ML) INJECTION PRN
Status: DISCONTINUED | OUTPATIENT
Start: 2023-11-14 | End: 2023-11-15 | Stop reason: HOSPADM

## 2023-11-14 RX ORDER — ALBUTEROL SULFATE 2.5 MG/3ML
2.5 SOLUTION RESPIRATORY (INHALATION)
Status: DISCONTINUED | OUTPATIENT
Start: 2023-11-14 | End: 2023-11-15 | Stop reason: HOSPADM

## 2023-11-14 RX ORDER — ENOXAPARIN SODIUM 100 MG/ML
40 INJECTION SUBCUTANEOUS DAILY
Status: DISCONTINUED | OUTPATIENT
Start: 2023-11-14 | End: 2023-11-15 | Stop reason: HOSPADM

## 2023-11-14 RX ORDER — OXYCODONE HYDROCHLORIDE 5 MG/1
5 TABLET ORAL EVERY 4 HOURS PRN
Status: DISCONTINUED | OUTPATIENT
Start: 2023-11-14 | End: 2023-11-15 | Stop reason: HOSPADM

## 2023-11-14 RX ORDER — SODIUM CHLORIDE 9 MG/ML
INJECTION, SOLUTION INTRAVENOUS PRN
Status: DISCONTINUED | OUTPATIENT
Start: 2023-11-14 | End: 2023-11-15 | Stop reason: HOSPADM

## 2023-11-14 RX ORDER — CETIRIZINE HYDROCHLORIDE 10 MG/1
10 TABLET ORAL DAILY
Status: DISCONTINUED | OUTPATIENT
Start: 2023-11-14 | End: 2023-11-15 | Stop reason: HOSPADM

## 2023-11-14 RX ORDER — POLYETHYLENE GLYCOL 3350 17 G/17G
17 POWDER, FOR SOLUTION ORAL DAILY PRN
Status: DISCONTINUED | OUTPATIENT
Start: 2023-11-14 | End: 2023-11-15 | Stop reason: HOSPADM

## 2023-11-14 RX ORDER — POTASSIUM CHLORIDE 7.45 MG/ML
10 INJECTION INTRAVENOUS PRN
Status: DISCONTINUED | OUTPATIENT
Start: 2023-11-14 | End: 2023-11-15 | Stop reason: HOSPADM

## 2023-11-14 RX ORDER — ONDANSETRON 2 MG/ML
4 INJECTION INTRAMUSCULAR; INTRAVENOUS EVERY 6 HOURS PRN
Status: DISCONTINUED | OUTPATIENT
Start: 2023-11-14 | End: 2023-11-15 | Stop reason: HOSPADM

## 2023-11-14 RX ORDER — DOXYCYCLINE HYCLATE 100 MG/1
100 CAPSULE ORAL EVERY 12 HOURS SCHEDULED
Status: DISCONTINUED | OUTPATIENT
Start: 2023-11-14 | End: 2023-11-15 | Stop reason: HOSPADM

## 2023-11-14 RX ORDER — POTASSIUM CHLORIDE 20 MEQ/1
40 TABLET, EXTENDED RELEASE ORAL PRN
Status: DISCONTINUED | OUTPATIENT
Start: 2023-11-14 | End: 2023-11-15 | Stop reason: HOSPADM

## 2023-11-14 RX ORDER — GUAIFENESIN 600 MG/1
1200 TABLET, EXTENDED RELEASE ORAL 2 TIMES DAILY
Status: DISCONTINUED | OUTPATIENT
Start: 2023-11-14 | End: 2023-11-15 | Stop reason: HOSPADM

## 2023-11-14 RX ORDER — ACETAMINOPHEN 650 MG/1
650 SUPPOSITORY RECTAL EVERY 6 HOURS PRN
Status: DISCONTINUED | OUTPATIENT
Start: 2023-11-14 | End: 2023-11-15 | Stop reason: HOSPADM

## 2023-11-14 RX ORDER — IPRATROPIUM BROMIDE AND ALBUTEROL SULFATE 2.5; .5 MG/3ML; MG/3ML
1 SOLUTION RESPIRATORY (INHALATION)
Status: DISCONTINUED | OUTPATIENT
Start: 2023-11-14 | End: 2023-11-14

## 2023-11-14 RX ORDER — SODIUM CHLORIDE 0.9 % (FLUSH) 0.9 %
5-40 SYRINGE (ML) INJECTION EVERY 12 HOURS SCHEDULED
Status: DISCONTINUED | OUTPATIENT
Start: 2023-11-14 | End: 2023-11-15 | Stop reason: HOSPADM

## 2023-11-14 RX ORDER — PANTOPRAZOLE SODIUM 40 MG/1
40 TABLET, DELAYED RELEASE ORAL
Status: DISCONTINUED | OUTPATIENT
Start: 2023-11-14 | End: 2023-11-15 | Stop reason: HOSPADM

## 2023-11-14 RX ORDER — MAGNESIUM HYDROXIDE/ALUMINUM HYDROXICE/SIMETHICONE 120; 1200; 1200 MG/30ML; MG/30ML; MG/30ML
30 SUSPENSION ORAL EVERY 6 HOURS PRN
Status: DISCONTINUED | OUTPATIENT
Start: 2023-11-14 | End: 2023-11-15 | Stop reason: HOSPADM

## 2023-11-14 RX ORDER — ONDANSETRON 4 MG/1
4 TABLET, ORALLY DISINTEGRATING ORAL EVERY 8 HOURS PRN
Status: DISCONTINUED | OUTPATIENT
Start: 2023-11-14 | End: 2023-11-15 | Stop reason: HOSPADM

## 2023-11-14 RX ORDER — CALCIUM CARBONATE 500 MG/1
500 TABLET, CHEWABLE ORAL 3 TIMES DAILY PRN
Status: DISCONTINUED | OUTPATIENT
Start: 2023-11-14 | End: 2023-11-15 | Stop reason: HOSPADM

## 2023-11-14 RX ORDER — IPRATROPIUM BROMIDE 21 UG/1
2 SPRAY, METERED NASAL 2 TIMES DAILY
Status: DISCONTINUED | OUTPATIENT
Start: 2023-11-14 | End: 2023-11-15 | Stop reason: HOSPADM

## 2023-11-14 RX ORDER — ACETAMINOPHEN 325 MG/1
650 TABLET ORAL EVERY 6 HOURS PRN
Status: DISCONTINUED | OUTPATIENT
Start: 2023-11-14 | End: 2023-11-15 | Stop reason: HOSPADM

## 2023-11-14 RX ORDER — BUDESONIDE 0.5 MG/2ML
0.5 INHALANT ORAL
Status: DISCONTINUED | OUTPATIENT
Start: 2023-11-14 | End: 2023-11-15 | Stop reason: HOSPADM

## 2023-11-14 RX ORDER — HYDROCODONE BITARTRATE AND HOMATROPINE METHYLBROMIDE ORAL SOLUTION 5; 1.5 MG/5ML; MG/5ML
5 LIQUID ORAL EVERY 4 HOURS PRN
Status: DISCONTINUED | OUTPATIENT
Start: 2023-11-14 | End: 2023-11-15 | Stop reason: HOSPADM

## 2023-11-14 RX ADMIN — ALBUTEROL SULFATE 2.5 MG: 2.5 SOLUTION RESPIRATORY (INHALATION) at 19:57

## 2023-11-14 RX ADMIN — BUDESONIDE INHALATION 500 MCG: 0.5 SUSPENSION RESPIRATORY (INHALATION) at 13:51

## 2023-11-14 RX ADMIN — IPRATROPIUM BROMIDE 2 SPRAY: 21 SPRAY NASAL at 13:00

## 2023-11-14 RX ADMIN — SODIUM CHLORIDE, PRESERVATIVE FREE 10 ML: 5 INJECTION INTRAVENOUS at 08:06

## 2023-11-14 RX ADMIN — HYDROCODONE BITARTRATE AND HOMATROPINE METHYLBROMIDE 5 ML: 5; 1.5 SOLUTION ORAL at 21:45

## 2023-11-14 RX ADMIN — FLUTICASONE PROPIONATE 2 SPRAY: 50 SPRAY, METERED NASAL at 00:36

## 2023-11-14 RX ADMIN — IPRATROPIUM BROMIDE 2 SPRAY: 21 SPRAY NASAL at 21:46

## 2023-11-14 RX ADMIN — METHYLPREDNISOLONE SODIUM SUCCINATE 40 MG: 40 INJECTION INTRAMUSCULAR; INTRAVENOUS at 05:15

## 2023-11-14 RX ADMIN — DOXYCYCLINE HYCLATE 100 MG: 100 CAPSULE ORAL at 21:44

## 2023-11-14 RX ADMIN — IPRATROPIUM BROMIDE AND ALBUTEROL SULFATE 1 DOSE: 2.5; .5 SOLUTION RESPIRATORY (INHALATION) at 07:40

## 2023-11-14 RX ADMIN — BUDESONIDE INHALATION 500 MCG: 0.5 SUSPENSION RESPIRATORY (INHALATION) at 19:57

## 2023-11-14 RX ADMIN — MONTELUKAST 10 MG: 10 TABLET, FILM COATED ORAL at 21:45

## 2023-11-14 RX ADMIN — SODIUM CHLORIDE, PRESERVATIVE FREE 0.45 ML: 5 INJECTION INTRAVENOUS at 16:02

## 2023-11-14 RX ADMIN — OXYCODONE 5 MG: 5 TABLET ORAL at 11:19

## 2023-11-14 RX ADMIN — ALBUTEROL SULFATE 2.5 MG: 2.5 SOLUTION RESPIRATORY (INHALATION) at 13:52

## 2023-11-14 RX ADMIN — METHYLPREDNISOLONE SODIUM SUCCINATE 40 MG: 40 INJECTION INTRAMUSCULAR; INTRAVENOUS at 18:22

## 2023-11-14 RX ADMIN — PANTOPRAZOLE SODIUM 40 MG: 40 TABLET, DELAYED RELEASE ORAL at 05:14

## 2023-11-14 RX ADMIN — SODIUM CHLORIDE, PRESERVATIVE FREE 10 ML: 5 INJECTION INTRAVENOUS at 21:47

## 2023-11-14 RX ADMIN — CETIRIZINE HYDROCHLORIDE 10 MG: 10 TABLET, FILM COATED ORAL at 11:19

## 2023-11-14 RX ADMIN — GUAIFENESIN 1200 MG: 600 TABLET ORAL at 21:45

## 2023-11-14 ASSESSMENT — PAIN SCALES - GENERAL
PAINLEVEL_OUTOF10: 8
PAINLEVEL_OUTOF10: 0

## 2023-11-14 ASSESSMENT — PAIN DESCRIPTION - LOCATION: LOCATION: HEAD

## 2023-11-14 NOTE — PROGRESS NOTES
TRANSFER - IN REPORT:    Verbal report received from RN  on Daniel Sow  being received from ED for routine progression of patient care      Report consisted of patient's Situation, Background, Assessment and   Recommendations(SBAR). Information from the following report(s) Adult Overview, MAR, and Recent Results was reviewed with the receiving nurse. Opportunity for questions and clarification was provided.

## 2023-11-14 NOTE — ED TRIAGE NOTES
Ambulatory to triage.  SHOB \"breathing through a straw\" has completed breathing txs at home x4 with no relief. Started having breathing problems in July seeing pulmonology but has not found the cause yet, also set up with cardiology to be seen tomorrow.  was  for 30 years. Placed onto 2L NC during triage.  EKG obtained during triage and shown to MD.

## 2023-11-14 NOTE — ED NOTES
TRANSFER - OUT REPORT:    Verbal report given to RUPERT Fernandez on Debra Seals  being transferred to room 628 for routine progression of patient care       Report consisted of patient's Situation, Background, Assessment and   Recommendations(SBAR). Information from the following report(s) Nurse Handoff Report, ED Encounter Summary, ED SBAR, Adult Overview, MAR, Recent Results, and Neuro Assessment was reviewed with the receiving nurse. Lines:   Peripheral IV 11/13/23 Right Antecubital (Active)        Opportunity for questions and clarification was provided.       Patient transported with:  Jacquelyn Dhillon RN  11/14/23 2810

## 2023-11-14 NOTE — PLAN OF CARE
Problem: Discharge Planning  Goal: Discharge to home or other facility with appropriate resources  Outcome: Progressing  Flowsheets (Taken 11/14/2023 0029)  Discharge to home or other facility with appropriate resources:   Identify barriers to discharge with patient and caregiver   Arrange for needed discharge resources and transportation as appropriate     Problem: Pain  Goal: Verbalizes/displays adequate comfort level or baseline comfort level  Outcome: Progressing

## 2023-11-14 NOTE — CARE COORDINATION
Met with Mr. Alvarado Peguero and his wife, Deb Sterling, at bedside for initial CM assessment. Patient is alert and oriented x4. Demographics and PCP verified. Mr. Alvarado Peguero lives with his wife and son in a one level home with a ramped entrance. He was independent with mobility and ADLs at most recent baseline and used no DME or services. He is retired but remains an active  in the community. Mr. Alvarado Peguero plans to return home at discharge with family and anticipates no needs. CM will continue to follow. 11/14/23 9608   Service Assessment   Patient Orientation Alert and Oriented;Person;Place;Situation   Cognition Alert   History Provided By Patient   Primary Caregiver Self   Accompanied By/Relationship [address] [facility] [place] Spouse/Significant Other;Children;Family Members;Confucian[employer]/[place] Community;Friends/Neighbors   Patient's Healthcare Decision Maker is: Legal Next of Kin   PCP Verified by CM Yes   Last Visit to PCP Within last 3 months   Prior Functional Level Independent in ADLs/IADLs   Current Functional Level Independent in ADLs/IADLs   Can patient return to prior living arrangement Yes   Ability to make needs known: Good   Family able to assist with home care needs: Yes   Would you like for me to discuss the discharge plan with any other family members/significant others, and if so, who? Yes  (Wife-Camilla)   Financial Resources None   [employer] None   Social/Functional History   Lives With Spouse; Son   Type of [address] One level   Home Access Ramped entrance   [address] shower seat;Grab bars in [address] Yes   Mode of Transportation Truck   Occupation Retired   Discharge Planning   Type of Residence House   Living Arrangements Spouse/Significant Other;Children   Current Services Prior To Admission None   Potential Assistance Needed N/A   DME Ordered? No   Potential Assistance Purchasing Medications No   Type of Home Care Services None   Patient expects to be discharged to: House   One/Two Story Residence One story   History of falls? 0   Condition of Participation: Discharge Planning   The Plan for Transition of Care is related to the following treatment goals: The patient will return home with family.

## 2023-11-14 NOTE — PROGRESS NOTES
Comprehensive Nutrition Assessment    Type and Reason for Visit: Initial, Positive Nutrition Screen  Malnutrition Screening Tool: Malnutrition Screen  Have you recently lost weight without trying?: 2 to 13 pounds (1 point)  Have you been eating poorly because of a decreased appetite?: Yes (1 point)  Malnutrition Screening Tool Score: 2    Nutrition Recommendations/Plan:   Meals and Snacks:  Diet: Continue current order  Nutrition Supplement Therapy:  Medical food supplement therapy:  Initiate Ensure Enlive twice per day (this provides 350 kcal and 20 grams protein per bottle)     Malnutrition Assessment:  Malnutrition Status: At risk for malnutrition (Comment) (Prolonged declined intake with 5.1% wt loss in ~2 months)    No fat or muscle wasting  Nutrition Assessment:  Nutrition History: Patient reports declined intake for months. He has continued to try to eat 3 times per day, some snacks. He reports progressive difficulty due to feeling like some foods do not want to go down and also coughing impacting intake. He has been trying to eat softer foods and milkshakes and has has not been attempting whole pieces of meat or hard foods. He endorses ~10# weight loss. Do You Have Any Cultural, Holiness, or Ethnic Food Preferences?: No   Nutrition Background:       Patient without PMH. He presented with wheezing, increasing shortness of breath, chest pain, wt loss, and N/V over several months. He was admitted for workup for his shortness of breath. Nutrition Interval:  Patient seen sitting up in bed with wife present. He still c/o some difficulty swallowing but was able to eat all of grits and eggs this am. He states that he has ordered meatloaf and mashed potatoes which he thinks he will also be able to tolerate. Discussed nutrition supplement in the event that he is not able to tolerate a meal and he is agreeable. Current Nutrition Therapies:  ADULT DIET;  Regular    Current Intake:   Average Meal Intake: % Clavical fracture

## 2023-11-14 NOTE — CONSULTS
History and Physical Initial Visit NOTE           11/14/2023    Nita Rod                        Date of Admission:  11/13/2023    The patient's chart is reviewed and the patient is discussed with the staff. Subjective:     Patient is a 66 y.o.  male seen and evaluated at the request of Dr. Ilene Henry for shortness of breath and wheezing. Pt was seen by Roney Horne NP on 10/20 for new pt evaluation for chronic cough, wheezing, and shortness of breath. He was unable to perform spirometry or FeNO secondary to coughing. He has been a  x 30 years. Chest CT was ordered which pt had on 10/30. His lungs were unremarkable. He presented to the ER on 11/13 with complaints of shortness of breath, wheezing, cough, and chest pain. He had an elevated troponin. Pt had a chest CTA without PE but bilateral mild bronchial wall thickening. RVP negative. We were consulted to assist.   Pt is on RA. Pt's wife at bedside. Per his report, he started having issues with cough 7/2023. He was lying down at night and noticed wheezing when he first laid down. He then started having coughing and wheezing throughout the day. Pt continues to have clear sputum production. He denies fever, chills, or sweats. He reports that he has noticed increased cough and wheezing since his visit with [facility] Roberts. It got progressively worse and he felt like he was breathing through a straw. He also had chest pain where it felt like an elephant was sitting on his chest which is why he presented to the ER. He has been taking flonase and zyrtec at home. He was ordered to be on symbicort but he was only using albuterol. He is using albuterol nebs but using his mother's nebulizer machine. When I mentioned getting a nebulizer for him, pt's wife seemed upset that we were not \"fixing\" the problem. We discussed this further and both agreed with treatment plan at this time.  Pt's wife reports that someone mentioned a possible His lungs were unremarkable. He presented to the ER on 11/13 with complaints of shortness of breath, wheezing, cough, and chest pain. He had an elevated troponin. Pt had a chest CTA without PE but bilateral mild bronchial wall thickening with concern for bronchitis. RVP negative. We were consulted to assist.     Principal Problem:    Shortness of breath  Plan: likely secondary to cough, pt not hypoxic   Active Problems:    Chronic cough  Plan: likely has underlying asthma but unable to check spirometry as outpt, needs a maintenance inhaler, which he was not taking at home. Now on pulmicort and albuterol nebs. Chest pain  Plan: echo pending, he was supposed to see cardiology as a new pt today in the outpatient setting. Full Code    Thank you very much for this referral.  We appreciate the opportunity to participate in this patient's care. Will follow along with above stated plan. In this split/shared evaluation I performed performed a medically appropriate history and exam, counseled and educated the patient and/or family member, ordered medications, tests or procedures, documented information in EMR, and coordinated care. which accounted for 30 minutes of clinical time. ONUR Hsu    In this split/shared evaluation I performed reviewed the patients's H&P, available images, labs, cultures. , discussed case in detail with NPP, performed a medically appropriate history and exam, counseled and educated the patient and/or family member, ordered and/or reviewed medications, tests or procedures, documented information in EMR, independently interpreted images, and coordinated care. which accounted for 31 minutes clinical time. Impression: 67 y/o male peristent cough, wheezing, shortness of breath. Had 11% eos on admission and mother has asthma. He had failed PFT a few times for  exams, but normally passed. I recommend continuing nebs/IV steroids for now.  Change to prednisone when

## 2023-11-14 NOTE — H&P
Hospitalist History and Physical   Admit Date:  2023  8:45 PM   Name:  Karyle Orange   Age:  66 y.o. Sex:  male  :  1965   MRN:  [id number]   Room:  06/    Presenting Complaint: Shortness of Breath     Reason(s) for Admission: Shortness of breath [R06.02]       Assessment & Plan:     Principal Problem:    Shortness of breath -pleasant 66year-old gentleman, no significant PMH, presents with 5-month history of chronic cough, now with wheezing shortness of breath along with new chest pain. He has had several courses of outpatient antibiotics and steroid tapers without much improvement. Pulmonary evaluation started last month. CT scan chest tonight shows bronchial wall thickening. He is a retired  and lifelong non-smoker. Differential for his upper airway cough syndrome includes occupational lung disease, postnasal drip, asthma, severe GERD, eosinophilic bronchitis, underlying heart disease, other. Labs and EKG did not show anything acute. Active Problems:    Chronic cough      Chest pain      Plan:   Patient will be admitted to a remote telemetry bed, observation status  Due to his symptoms of chest pain and heaviness tonight, will check a troponin as well as an echocardiogram.  Patient actually had an appointment to see cardiology in a.m. Advised patient to reschedule appointment, unless echo or troponin comes back abnormal  Patient did notice some improvement with IV steroids and nebs. We will continue this overnight. We will also start him on some Flonase and continue Zyrtec that was started by pulmonary. Patient does give a history of severe reflux for which he takes Tums 3-4 times a week. He has never tried H2 blockers or PPI. We will also start this tonight to see if his symptoms improve  Patient was recently evaluated by pulmonary last month.   We will consult pulmonary in a.m. for any additional recommendations  Plan of care discussed with patient and

## 2023-11-14 NOTE — PROGRESS NOTES
MCH 30.3 26.1 - 32.9 PG    MCHC 33.7 31.4 - 35.0 g/dL    RDW 14.7 (H) 11.9 - 14.6 %    Platelets 016 906 - 238 K/uL    MPV 10.8 9.4 - 12.3 FL    nRBC 0.00 0.0 - 0.2 K/uL    Neutrophils % 95 (H) 43 - 78 %    Lymphocytes % 4 (L) 13 - 44 %    Monocytes % 1 (L) 4.0 - 12.0 %    Eosinophils % 0 (L) 0.5 - 7.8 %    Basophils % 0 0.0 - 2.0 %    Immature Granulocytes 0 0.0 - 5.0 %    Neutrophils Absolute 4.5 1.7 - 8.2 K/UL    Lymphocytes Absolute 0.2 (L) 0.5 - 4.6 K/UL    Monocytes Absolute 0.0 (L) 0.1 - 1.3 K/UL    Eosinophils Absolute 0.0 0.0 - 0.8 K/UL    Basophils Absolute 0.0 0.0 - 0.2 K/UL    Absolute Immature Granulocyte 0.0 0.0 - 0.5 K/UL    RBC Comment SLIGHT  ANISOCYTOSIS + POIKILOCYTOSIS        WBC Comment Result Confirmed By Smear      Platelet Comment ADEQUATE      Differential Type AUTOMATED     Troponin    Collection Time: 11/14/23  6:09 AM   Result Value Ref Range    Troponin, High Sensitivity 15.1 (H) 0 - 14 pg/mL       Current Meds:  Current Facility-Administered Medications   Medication Dose Route Frequency    sodium chloride flush 0.9 % injection 5-40 mL  5-40 mL IntraVENous 2 times per day    sodium chloride flush 0.9 % injection 5-40 mL  5-40 mL IntraVENous PRN    0.9 % sodium chloride infusion   IntraVENous PRN    potassium chloride (KLOR-CON M) extended release tablet 40 mEq  40 mEq Oral PRN    Or    potassium bicarb-citric acid (EFFER-K) effervescent tablet 40 mEq  40 mEq Oral PRN    Or    potassium chloride 10 mEq/100 mL IVPB (Peripheral Line)  10 mEq IntraVENous PRN    magnesium sulfate 2000 mg in 50 mL IVPB premix  2,000 mg IntraVENous PRN    ondansetron (ZOFRAN-ODT) disintegrating tablet 4 mg  4 mg Oral Q8H PRN    Or    ondansetron (ZOFRAN) injection 4 mg  4 mg IntraVENous Q6H PRN    polyethylene glycol (GLYCOLAX) packet 17 g  17 g Oral Daily PRN    aluminum & magnesium hydroxide-simethicone (MAALOX) 200-200-20 MG/5ML suspension 30 mL  30 mL Oral Q6H PRN    acetaminophen (TYLENOL) tablet 650 mg 650 mg Oral Q6H PRN    Or    acetaminophen (TYLENOL) suppository 650 mg  650 mg Rectal Q6H PRN    enoxaparin (LOVENOX) injection 40 mg  40 mg SubCUTAneous Daily    pantoprazole (PROTONIX) tablet 40 mg  40 mg Oral QAM AC    calcium carbonate (TUMS) chewable tablet 500 mg  500 mg Oral TID PRN    fluticasone (FLONASE) 50 MCG/ACT nasal spray 2 spray  2 spray Each Nostril Daily    HYDROcodone homatropine (HYCODAN) 5-1.5 MG/5ML solution 5 mL  5 mL Oral Q4H PRN    oxyCODONE (ROXICODONE) immediate release tablet 5 mg  5 mg Oral Q4H PRN    methylPREDNISolone sodium succ (SOLU-MEDROL) 40 mg in sterile water 1 mL injection  40 mg IntraVENous Q12H    albuterol (PROVENTIL) (2.5 MG/3ML) 0.083% nebulizer solution 2.5 mg  2.5 mg Nebulization Q6H WA RT    budesonide (PULMICORT) nebulizer suspension 500 mcg  0.5 mg Nebulization BID RT    cetirizine (ZYRTEC) tablet 10 mg  10 mg Oral Daily    montelukast (SINGULAIR) tablet 10 mg  10 mg Oral Nightly    ipratropium (ATROVENT) 0.03 % nasal spray 2 spray  2 spray Each Nostril BID    doxycycline hyclate (VIBRAMYCIN) capsule 100 mg  100 mg Oral 2 times per day    sodium chloride 0.9 % bolus 100 mL  100 mL IntraVENous Once    sodium chloride flush 0.9 % injection 10 mL  10 mL IntraVENous ONCE PRN     Signed: Ruth Chain AGACNP-BC Detail Level: Detailed

## 2023-11-14 NOTE — ED PROVIDER NOTES
Emergency Department Provider Note                   PCP:                Marissa Nance PA-C               Age: 66 y.o. Sex: male       ICD-10-CM    1. Wheezing  R06.2       2. Subacute cough  R05.2           DISPOSITION Decision To Admit 11/13/2023 10:59:56 PM        MDM  Number of Diagnoses or Management Options  Subacute cough  Wheezing  Diagnosis management comments: MEDICAL DECISION MAKING  Complexity of Problems Addressed:  1 or more chronic illnesses with a severe exacerbation or progression. Data Reviewed and Analyzed:  Category 1:   I independently ordered and reviewed each unique test.  I reviewed external records: provider visit note from outside specialist.    Category 2:   I interpreted the X-rays No acute infiltrate. Category 3: Discussion of management or test interpretation. The patient presents with ongoing SOB and wheezing. IV steroids and neb treatment ordered, as steroids have helped some in the past. CT scan was ordered due to the length and severity of symptoms. No PE or infiltrate, bronchial wall thickening is noted. Patient had minimal improvement with initial treatment. We did add a continuous neb treatment to see if we can get some relief for the patient. I reviewed outpatient records. At this time we will admit the patient for further work-up and testing. The hospitalist was consulted for admission. The patient was in agreement with this plan. The patient was admitted and I have discussed patient management with the admitting provider. Risk of Complications and/or Morbidity of Patient Management:  Shared medical decision making was utilized in creating the patients health plan today.                 Orders Placed This Encounter   Procedures    Respiratory Panel, Molecular, with COVID-19 (Restricted: peds pts or suitable admitted adults)    Blood Culture 1    Blood Culture 2    XR CHEST PORTABLE    CT CHEST PULMONARY EMBOLISM W CONTRAST    CBC with Auto

## 2023-11-15 VITALS
OXYGEN SATURATION: 95 % | HEART RATE: 86 BPM | BODY MASS INDEX: 25.71 KG/M2 | TEMPERATURE: 97.5 F | HEIGHT: 71 IN | RESPIRATION RATE: 22 BRPM | WEIGHT: 183.64 LBS | DIASTOLIC BLOOD PRESSURE: 77 MMHG | SYSTOLIC BLOOD PRESSURE: 131 MMHG

## 2023-11-15 PROBLEM — D72.19 OTHER EOSINOPHILIA: Status: ACTIVE | Noted: 2023-11-15

## 2023-11-15 PROBLEM — J45.41 MODERATE PERSISTENT ASTHMA WITH EXACERBATION: Status: ACTIVE | Noted: 2023-11-15

## 2023-11-15 LAB
BACTERIA SPEC CULT: NORMAL
BACTERIA SPEC CULT: NORMAL
C-ANCA TITR SER IF: NORMAL TITER
GRAM STN SPEC: NORMAL
MYELOPEROXIDASE AB SER IA-ACNC: <0.2 UNITS (ref 0–0.9)
P-ANCA ATYPICAL TITR SER IF: NORMAL TITER
P-ANCA TITR SER IF: NORMAL TITER
PROTEINASE3 AB SER IA-ACNC: <0.2 UNITS (ref 0–0.9)
SERVICE CMNT-IMP: NORMAL

## 2023-11-15 PROCEDURE — 6370000000 HC RX 637 (ALT 250 FOR IP): Performed by: STUDENT IN AN ORGANIZED HEALTH CARE EDUCATION/TRAINING PROGRAM

## 2023-11-15 PROCEDURE — 6370000000 HC RX 637 (ALT 250 FOR IP): Performed by: HOSPITALIST

## 2023-11-15 PROCEDURE — 2580000003 HC RX 258: Performed by: HOSPITALIST

## 2023-11-15 PROCEDURE — 6360000002 HC RX W HCPCS: Performed by: PHYSICIAN ASSISTANT

## 2023-11-15 PROCEDURE — G0378 HOSPITAL OBSERVATION PER HR: HCPCS

## 2023-11-15 PROCEDURE — 2700000000 HC OXYGEN THERAPY PER DAY

## 2023-11-15 PROCEDURE — 2580000003 HC RX 258: Performed by: PHYSICIAN ASSISTANT

## 2023-11-15 PROCEDURE — 6370000000 HC RX 637 (ALT 250 FOR IP): Performed by: PHYSICIAN ASSISTANT

## 2023-11-15 PROCEDURE — 94640 AIRWAY INHALATION TREATMENT: CPT

## 2023-11-15 PROCEDURE — 99232 SBSQ HOSP IP/OBS MODERATE 35: CPT | Performed by: INTERNAL MEDICINE

## 2023-11-15 PROCEDURE — 94761 N-INVAS EAR/PLS OXIMETRY MLT: CPT

## 2023-11-15 PROCEDURE — 96376 TX/PRO/DX INJ SAME DRUG ADON: CPT

## 2023-11-15 RX ORDER — MONTELUKAST SODIUM 10 MG/1
10 TABLET ORAL NIGHTLY
Qty: 30 TABLET | Refills: 3 | Status: SHIPPED | OUTPATIENT
Start: 2023-11-15

## 2023-11-15 RX ORDER — IPRATROPIUM BROMIDE 21 UG/1
2 SPRAY, METERED NASAL 2 TIMES DAILY
Qty: 30 ML | Refills: 3 | Status: SHIPPED | OUTPATIENT
Start: 2023-11-15

## 2023-11-15 RX ORDER — PANTOPRAZOLE SODIUM 40 MG/1
40 TABLET, DELAYED RELEASE ORAL
Qty: 30 TABLET | Refills: 3 | Status: SHIPPED | OUTPATIENT
Start: 2023-11-16

## 2023-11-15 RX ORDER — GUAIFENESIN 600 MG/1
1200 TABLET, EXTENDED RELEASE ORAL 2 TIMES DAILY
Qty: 20 TABLET | Refills: 0 | Status: SHIPPED | OUTPATIENT
Start: 2023-11-15 | End: 2023-11-20

## 2023-11-15 RX ORDER — LEVALBUTEROL TARTRATE 45 UG/1
1 AEROSOL, METERED ORAL EVERY 4 HOURS PRN
Qty: 1 EACH | Refills: 3 | Status: SHIPPED | OUTPATIENT
Start: 2023-11-15 | End: 2024-11-14

## 2023-11-15 RX ORDER — LEVALBUTEROL INHALATION SOLUTION 0.63 MG/3ML
0.63 SOLUTION RESPIRATORY (INHALATION) EVERY 4 HOURS PRN
Qty: 120 EACH | Refills: 3 | Status: SHIPPED | OUTPATIENT
Start: 2023-11-15

## 2023-11-15 RX ORDER — FLUTICASONE PROPIONATE 50 MCG
2 SPRAY, SUSPENSION (ML) NASAL DAILY
Qty: 16 G | Refills: 3 | Status: SHIPPED | OUTPATIENT
Start: 2023-11-15

## 2023-11-15 RX ORDER — PREDNISONE 20 MG/1
TABLET ORAL
Qty: 33 TABLET | Refills: 0 | Status: SHIPPED | OUTPATIENT
Start: 2023-11-15

## 2023-11-15 RX ORDER — DOXYCYCLINE HYCLATE 100 MG/1
100 CAPSULE ORAL EVERY 12 HOURS SCHEDULED
Qty: 12 CAPSULE | Refills: 0 | Status: SHIPPED | OUTPATIENT
Start: 2023-11-15 | End: 2023-11-21

## 2023-11-15 RX ADMIN — PANTOPRAZOLE SODIUM 40 MG: 40 TABLET, DELAYED RELEASE ORAL at 05:03

## 2023-11-15 RX ADMIN — METHYLPREDNISOLONE SODIUM SUCCINATE 40 MG: 40 INJECTION INTRAMUSCULAR; INTRAVENOUS at 05:03

## 2023-11-15 RX ADMIN — IPRATROPIUM BROMIDE 2 SPRAY: 21 SPRAY NASAL at 08:53

## 2023-11-15 RX ADMIN — SODIUM CHLORIDE, PRESERVATIVE FREE 10 ML: 5 INJECTION INTRAVENOUS at 09:06

## 2023-11-15 RX ADMIN — ALBUTEROL SULFATE 2.5 MG: 2.5 SOLUTION RESPIRATORY (INHALATION) at 07:18

## 2023-11-15 RX ADMIN — DOXYCYCLINE HYCLATE 100 MG: 100 CAPSULE ORAL at 08:52

## 2023-11-15 RX ADMIN — BUDESONIDE INHALATION 500 MCG: 0.5 SUSPENSION RESPIRATORY (INHALATION) at 07:18

## 2023-11-15 RX ADMIN — CETIRIZINE HYDROCHLORIDE 10 MG: 10 TABLET, FILM COATED ORAL at 08:52

## 2023-11-15 RX ADMIN — GUAIFENESIN 1200 MG: 600 TABLET ORAL at 08:52

## 2023-11-15 RX ADMIN — FLUTICASONE PROPIONATE 2 SPRAY: 50 SPRAY, METERED NASAL at 08:53

## 2023-11-15 NOTE — PROGRESS NOTES
Pt  bed in lowest position, wheels locked, and call light within reach. Hourly rounds completed. VSS. IV is patent and dressing is clean, dry, and intact. PRN hycodan given for pt's cough.

## 2023-11-15 NOTE — PROGRESS NOTES
Pt had an echo done today. Pt is waiting for results to be explained. Pt was put on 1L of oxygen d/t O2 of 89%. Sputum sample was obtained and sent to lab around 1830. Hourly rounding completed and all known needs met at this time. Bed is currently low, call light was left in reach, and pt was encouraged to ask for assistance. Pt was left resting in bed with no complaints. Gave bedside report to oncoming nurse. Wife and son at bedside.

## 2023-11-15 NOTE — PROGRESS NOTES
Shukri Baird  Admission Date: 11/13/2023         Daily Progress Note: 11/15/2023    The patient's chart is reviewed and the patient is discussed with the staff. Background: Pt is a 65 yo male with a history of chronic cough, wheezing, and shortness of breath. Pt was seen in our office on 10/20 but  was unable to perform spirometry or FeNO secondary to coughing. He has been a  x 30 years, retired in 2017. Chest CT was ordered which pt had on 10/30. His lungs were unremarkable at that time. He presented to the ER on 11/13 with complaints of shortness of breath, wheezing, cough, and chest pain. He had an elevated troponin. Pt had a chest CTA without PE but bilateral mild bronchial wall thickening. RVP negative. We were consulted to assist.   Subjective:     Pt is sitting up in the window sill on RA. Pt wants to go home. He does report some improvement in cough but he is still coughing some. His HR has been elevated with albuterol. He would like something different.      Current Facility-Administered Medications   Medication Dose Route Frequency    sodium chloride flush 0.9 % injection 5-40 mL  5-40 mL IntraVENous 2 times per day    sodium chloride flush 0.9 % injection 5-40 mL  5-40 mL IntraVENous PRN    0.9 % sodium chloride infusion   IntraVENous PRN    potassium chloride (KLOR-CON M) extended release tablet 40 mEq  40 mEq Oral PRN    Or    potassium bicarb-citric acid (EFFER-K) effervescent tablet 40 mEq  40 mEq Oral PRN    Or    potassium chloride 10 mEq/100 mL IVPB (Peripheral Line)  10 mEq IntraVENous PRN    magnesium sulfate 2000 mg in 50 mL IVPB premix  2,000 mg IntraVENous PRN    ondansetron (ZOFRAN-ODT) disintegrating tablet 4 mg  4 mg Oral Q8H PRN    Or    ondansetron (ZOFRAN) injection 4 mg  4 mg IntraVENous Q6H PRN    polyethylene glycol (GLYCOLAX) packet 17 g  17 g Oral Daily PRN    aluminum & magnesium hydroxide-simethicone (MAALOX) 200-200-20 MG/5ML suspension for 20 minutes clinical time. Impression:   Pt states he is feeling some better but ongoing wheeze and cough. Believe this is likely asthma exacerbation. Accompanied by eosinophilia of 11%. For now will plan to start bid symbicort as well as the above. If he fails high dose inhaled therapy would then move to anti eosinophil therapy but not yet. Needs xopenex as he is intolerant to albuterol due to tachycardia. Will need to get cPFT's once out of the current exacerbation. Pulm office TCM visit to be set up.      Girish French MD

## 2023-11-15 NOTE — DISCHARGE SUMMARY
Hospitalist Discharge Summary   Admit Date:  2023  8:45 PM   DC Note date: 11/15/2023  Name:  Ines Hope   Age:  66 y.o. Sex:  male  :  1965   MRN:  [id number]   Room:  [facility]  PCP:  Lisa Abreu PA-C    Presenting Complaint: Shortness of Breath     Initial Admission Diagnosis: Shortness of breath [R06.02]  Wheezing [R06.2]  Subacute cough [R05.2]     Problem List for this Hospitalization (present on admission):    Principal Problem:    Shortness of breath  Active Problems:    Chronic cough    Chest pain    Wheezing    Moderate persistent asthma with exacerbation    Other eosinophilia  Resolved Problems:    * No resolved hospital problems. [facility] Course:  Mr. Coco Arboleda is a 65 y/o male with no significant medical history who presented to the ER due to chronic cough of 5-month duration associated with new wheezing, shortness of breath, and chest pain. He has seen multiple providers outpatient and completed several courses of antibiotics and steroid tapers without improvement. New Pulmonology evaluation 10/20: unable to tolerate spirometry due to coughing, wheezing noted, ordered albuterol with nebulizer, Symbicort, steroid injection and prednisone taper, zyrtec. Outpatient CT obtained 10/30 that was unremarkable. Patient reports he has not been taking the Symbicort. Reports at times, it feels like he is breathing through a straw and that an elephant is sitting on his chest.  His most recent episode of this causing him to come to the ER for further evaluation. Lifetime non smoker. 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 Mother does have asthma.       ER workup: afebrile tachypneic with RR 24-26 HR 84 /88 94% on room air  Labs unremarkable, noted 11% eosinophils on differential  hsTrop 17.8 > 15.1 procal < 0.05 no leukocytosis RVP negative CXR unremarkable  EKG NSR without stemi  CT chest obtained: negative for PE, some mild bronchial wall thickening noted, daily  Qty: 1 each, Refills: 11      econazole nitrate 1 % cream Apply topically daily           STOP taking these medications       budesonide (PULMICORT FLEXHALER) 180 MCG/ACT AEPB inhaler Comments:   Reason for Stopping:         albuterol (PROVENTIL) (2.5 MG/3ML) 0.083% nebulizer solution Comments:   Reason for Stopping:         benzonatate (TESSALON) 200 MG capsule Comments:   Reason for Stopping:         albuterol sulfate HFA (VENTOLIN HFA) 108 (90 Base) MCG/ACT inhaler Comments:   Reason for Stopping:               Some of the medications may be marked as \"stop taking\" by the system; but in reality pt or family reported already being off these meds; defer to outpatient/prescribing providers. Procedures done this admission:  * No surgery found *    Consults this admission:  IP CONSULT TO PULMONOLOGY    Echocardiogram results:  11/13/23    ECHO (TTE) COMPLETE (PRN CONTRAST/BUBBLE/STRAIN/3D) 11/14/2023  5:11 PM (Final)    Interpretation Summary    Left Ventricle: Normal left ventricular systolic function with a visually estimated EF of 65 - 70%. Left ventricle size is normal. Mildly increased wall thickness. Normal wall motion. Normal diastolic function. Right Ventricle: Right ventricle size is normal. Normal systolic function. Mitral Valve: Mild regurgitation. Pericardium: Can not rule out small posterior pericardial effusion. No evidence of tamponade. IVC/SVC: IVC diameter is less than or equal to 21 mm and decreases greater than 50% during inspiration; therefore the estimated right atrial pressure is normal (~3 mmHg). Contrast used: Definity. Signed by: Елена Lozoya DO on 11/14/2023  5:11 PM      Diagnostic Imaging/Tests:   CT CHEST PULMONARY EMBOLISM W CONTRAST    Result Date: 11/13/2023  1. No pulmonary embolism. 2. Bilateral mild bronchial wall thickening. Correlate for bronchitis.   Donna Solano M.D. 11/13/2023 10:37:00 PM    XR CHEST PORTABLE    Result Date: 11/13/2023  No acute

## 2023-11-16 ENCOUNTER — TELEPHONE (OUTPATIENT)
Dept: FAMILY MEDICINE CLINIC | Facility: CLINIC | Age: 58
End: 2023-11-16

## 2023-11-16 ENCOUNTER — TELEPHONE (OUTPATIENT)
Dept: PULMONOLOGY | Age: 58
End: 2023-11-16

## 2023-11-16 LAB
ACID FAST STN SPEC: NEGATIVE
IGE SERPL-ACNC: 558 IU/ML (ref 6–495)
MYCOBACTERIUM SPEC QL CULT: NORMAL
SPECIMEN PREPARATION: NORMAL
SPECIMEN SOURCE: NORMAL

## 2023-11-16 NOTE — TELEPHONE ENCOUNTER
Care Transitions Initial Follow Up Call    Outreach made within 2 business days of discharge: Yes    Patient: Lemuel Tran Patient : 1965   MRN: [id number]  Reason for Admission: There are no discharge diagnoses documented for the most recent discharge. Discharge Date: 11/15/23       Spoke with: patient    Discharge department/facility: [facility] Interactive Patient Contact:  Was patient able to fill all prescriptions: Yes  Was patient instructed to bring all medications to the follow-up visit: Yes  Is patient taking all medications as directed in the discharge summary?  Yes  Does patient understand their discharge instructions: Yes  Does patient have questions or concerns that need addressed prior to 7-14 day follow up office visit: no    Scheduled appointment with PCP within 7-14 days    Follow Up  Future Appointments   Date Time Provider [address] [place] [address]   2023  1:20 PM Kylee Salgado PA-C [facility]   2024  9:40 AM Rah Medley, APRN - [facility]       Cullen Powers LPN

## 2023-11-16 NOTE — TELEPHONE ENCOUNTER
----- Message from ONUR Lyons sent at 11/15/2023 11:09 AM EST -----  Please contact patient and arrange a MINS: 40 minute  7 day TCM Follow up with spirometry prior to appointment.          Thank you,   ONUR Lyons

## 2023-11-17 ENCOUNTER — OFFICE VISIT (OUTPATIENT)
Dept: FAMILY MEDICINE CLINIC | Facility: CLINIC | Age: 58
End: 2023-11-17

## 2023-11-17 VITALS
TEMPERATURE: 98.6 F | SYSTOLIC BLOOD PRESSURE: 124 MMHG | HEIGHT: 71 IN | WEIGHT: 192.38 LBS | HEART RATE: 94 BPM | OXYGEN SATURATION: 94 % | DIASTOLIC BLOOD PRESSURE: 78 MMHG | BODY MASS INDEX: 26.93 KG/M2

## 2023-11-17 DIAGNOSIS — Z09 HOSPITAL DISCHARGE FOLLOW-UP: Primary | ICD-10-CM

## 2023-11-17 DIAGNOSIS — R05.3 CHRONIC COUGH: ICD-10-CM

## 2023-11-17 DIAGNOSIS — J45.41 MODERATE PERSISTENT ASTHMA WITH ACUTE EXACERBATION: ICD-10-CM

## 2023-11-17 ASSESSMENT — PATIENT HEALTH QUESTIONNAIRE - PHQ9
SUM OF ALL RESPONSES TO PHQ9 QUESTIONS 1 & 2: 0
SUM OF ALL RESPONSES TO PHQ QUESTIONS 1-9: 0
SUM OF ALL RESPONSES TO PHQ QUESTIONS 1-9: 0
2. FEELING DOWN, DEPRESSED OR HOPELESS: 0
SUM OF ALL RESPONSES TO PHQ QUESTIONS 1-9: 0
SUM OF ALL RESPONSES TO PHQ QUESTIONS 1-9: 0
1. LITTLE INTEREST OR PLEASURE IN DOING THINGS: 0

## 2023-11-17 NOTE — TELEPHONE ENCOUNTER
Care Transitions Initial Follow Up Call    Outreach made within 2 business days of discharge: Yes    Patient: Yessy Gu Patient : 1965   MRN: [id number]    Reason for Admission:   Principal Problem:    Shortness of breath  Active Problems:    Chronic cough    Chest pain    Wheezing    Moderate persistent asthma with exacerbation    Other eosinophilia  Resolved Problems:    * No resolved hospital problems. *    Discharge Date: 11/15/23       Spoke with: unable to reach pt.  JUSTINEM for return call    Discharge department/facility: [address], RN

## 2023-11-17 NOTE — PROGRESS NOTES
known as: PROTONIX  Take 1 tablet by mouth every morning (before breakfast)     predniSONE 20 MG tablet  Commonly known as: DELTASONE  Take 4 tabs x 3 days then 3 tabs x 3 days then 2 tabs x 3 days then 1 tab x 3 days then stop. Symbicort 160-4.5 MCG/ACT Aero  Generic drug: budesonide-formoterol  Inhale 2 puffs into the lungs 2 times daily                Medications marked \"taking\" at this time  Outpatient Medications Marked as Taking for the 11/17/23 encounter (Office Visit) with Joselin Soto PA-C   Medication Sig Dispense Refill    montelukast (SINGULAIR) 10 MG tablet Take 1 tablet by mouth nightly 30 tablet 3    fluticasone (FLONASE) 50 MCG/ACT nasal spray 2 sprays by Nasal route daily SHAKE LIQUID AND USE 2 SPRAYS IN EACH NOSTRIL TWICE DAILY 16 g 3    ipratropium (ATROVENT) 0.03 % nasal spray 2 sprays by Each Nostril route 2 times daily 30 mL 3    doxycycline hyclate (VIBRAMYCIN) 100 MG capsule Take 1 capsule by mouth every 12 hours for 12 doses 12 capsule 0    pantoprazole (PROTONIX) 40 MG tablet Take 1 tablet by mouth every morning (before breakfast) 30 tablet 3    levalbuterol (XOPENEX HFA) 45 MCG/ACT inhaler Inhale 1 puff into the lungs every 4 hours as needed for Wheezing 1 each 3    levalbuterol (XOPENEX) 0.63 MG/3ML nebulization Take 3 mLs by nebulization every 4 hours as needed for Wheezing 120 each 3    predniSONE (DELTASONE) 20 MG tablet Take 4 tabs x 3 days then 3 tabs x 3 days then 2 tabs x 3 days then 1 tab x 3 days then stop. 33 tablet 0    guaiFENesin (MUCINEX) 600 MG extended release tablet Take 2 tablets by mouth 2 times daily for 5 days 20 tablet 0    cetirizine (ZYRTEC) 10 MG tablet Take 1 tablet by mouth daily      SYMBICORT 160-4.5 MCG/ACT AERO Inhale 2 puffs into the lungs 2 times daily 1 each 11    econazole nitrate 1 % cream Apply topically daily          Medications patient taking as of now reconciled against medications ordered at time of hospital discharge:  Yes    A

## 2023-11-18 LAB
BACTERIA SPEC CULT: NORMAL
SERVICE CMNT-IMP: NORMAL

## 2023-11-19 LAB
BACTERIA SPEC CULT: NORMAL
SERVICE CMNT-IMP: NORMAL

## 2023-11-20 NOTE — TELEPHONE ENCOUNTER
Spoke with spouse, patient is doing really well, still having coughing but able to expectorate w/out trouble; has out of town travel planned this week, encouraged to take nebulizer and use as needed, continues mucinex, encouraged to pay attention to be well hydrated. Will keep planned appt for 1/23, if develops problems will call for sooner appt.

## 2023-12-12 ENCOUNTER — TELEPHONE (OUTPATIENT)
Dept: PULMONOLOGY | Age: 58
End: 2023-12-12

## 2023-12-12 LAB
ACID FAST STN SPEC: NEGATIVE
M AVIUM CMPLX RRNA SPEC QL PROBE: POSITIVE
M GORDONAE RRNA SPEC QL PROBE: ABNORMAL
M KANSASII RRNA SPEC QL PROBE: ABNORMAL
M TB CMPLX RRNA SPEC QL PROBE: NEGATIVE
MYCOBACTERIUM SPEC QL CULT: POSITIVE
OTHER: ABNORMAL
SPECIMEN PREPARATION: ABNORMAL
SPECIMEN SOURCE: ABNORMAL
SPECIMEN SOURCE: ABNORMAL
SUSCEPT TESTING: ABNORMAL

## 2023-12-12 NOTE — TELEPHONE ENCOUNTER
Reviewed with wife who verbalizes understanding and will have pt here tomorrow at [facility]

## 2023-12-12 NOTE — TELEPHONE ENCOUNTER
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 Pt is not scheduled to follow up until 1/23/24 with Gabriella Guerrero. Is there any way pt can be seen sooner to discuss + AFB and determine if treatment is warranted?

## 2023-12-13 ENCOUNTER — OFFICE VISIT (OUTPATIENT)
Dept: PULMONOLOGY | Age: 58
End: 2023-12-13
Payer: COMMERCIAL

## 2023-12-13 ENCOUNTER — TELEPHONE (OUTPATIENT)
Dept: PULMONOLOGY | Age: 58
End: 2023-12-13

## 2023-12-13 VITALS
TEMPERATURE: 97.5 F | WEIGHT: 197.2 LBS | BODY MASS INDEX: 27.61 KG/M2 | OXYGEN SATURATION: 98 % | RESPIRATION RATE: 18 BRPM | DIASTOLIC BLOOD PRESSURE: 74 MMHG | SYSTOLIC BLOOD PRESSURE: 118 MMHG | HEIGHT: 71 IN | HEART RATE: 61 BPM

## 2023-12-13 DIAGNOSIS — R05.3 CHRONIC COUGH: Primary | ICD-10-CM

## 2023-12-13 DIAGNOSIS — R06.2 WHEEZING: ICD-10-CM

## 2023-12-13 DIAGNOSIS — R63.4 UNINTENTIONAL WEIGHT LOSS: ICD-10-CM

## 2023-12-13 DIAGNOSIS — R09.3 ABNORMAL SPUTUM: ICD-10-CM

## 2023-12-13 DIAGNOSIS — J45.41 MODERATE PERSISTENT ASTHMA WITH EXACERBATION: ICD-10-CM

## 2023-12-13 PROCEDURE — 94664 DEMO&/EVAL PT USE INHALER: CPT | Performed by: NURSE PRACTITIONER

## 2023-12-13 PROCEDURE — 99214 OFFICE O/P EST MOD 30 MIN: CPT | Performed by: NURSE PRACTITIONER

## 2023-12-13 RX ORDER — PREDNISONE 20 MG/1
TABLET ORAL
Qty: 20 TABLET | Refills: 0 | Status: SHIPPED | OUTPATIENT
Start: 2023-12-13 | End: 2023-12-15

## 2023-12-13 RX ORDER — MONTELUKAST SODIUM 10 MG/1
10 TABLET ORAL NIGHTLY
Qty: 30 TABLET | Refills: 11 | Status: SHIPPED | OUTPATIENT
Start: 2023-12-13

## 2023-12-13 RX ORDER — FLUTICASONE FUROATE, UMECLIDINIUM BROMIDE AND VILANTEROL TRIFENATATE 200; 62.5; 25 UG/1; UG/1; UG/1
1 POWDER RESPIRATORY (INHALATION) DAILY
Qty: 1 EACH | Refills: 11 | Status: SHIPPED | OUTPATIENT
Start: 2023-12-13

## 2023-12-13 RX ORDER — LEVALBUTEROL TARTRATE 45 UG/1
2 AEROSOL, METERED ORAL EVERY 4 HOURS PRN
Qty: 1 EACH | Refills: 11 | Status: SHIPPED | OUTPATIENT
Start: 2023-12-13 | End: 2024-12-12

## 2023-12-13 NOTE — PROGRESS NOTES
Name:  Lazaro Le  YOB: 1965   MRN: [id number]      Office Visit: 12/13/2023        ASSESSMENT AND PLAN:  (Medical Decision Making)    Impression: 66 y.o. male retired  for 30 years, reporting wheezing, chronic cough for 3-months, shortness of breath    1. Chronic cough  -- Again largest suspicion at this point is poorly controlled, steroid dependent, eosinophilic asthma. Elevated IgE, elevated eosinophils. Overall he does look better today than he did on my first visit, but still sounds as though his symptoms are not well-controlled  -- Will change Symbicort to Trelegy 200, first dose given here in the office today since he did not do his Symbicort this morning  Patient is given verbal instructions on proper use of prescribed inhaler and is able to give adequate return demonstration. Inhaler education was indicated due to new therapy. --Xopenex nebulizer and inhaler as needed. I did encourage increased use when noticing wheezing and chest tightness and suspect she is not currently using it enough  --Discussed Biologics for control. -- Continue Singulair and Zyrtec, flonase, atrovent nasal sprays. --Continue pantoprazole daily  -- I have given him a few doses of prednisone to keep on hand at home to hopefully prevent him from needing to go back to the hospital    2. Unintentional weight loss  --resolved. He did have a positive AFB. Will confirm this with a second sputum. He is gaining weight, he is not having any night sweats. I do not suspect he has an active Mycobacterium infection, but will follow-up. 3. Wheezing  -- Some improvement in this, but continues to be a problem, waking him up at night. No orders of the defined types were placed in this encounter. No orders of the defined types were placed in this encounter. Shaunna Mukherjee, APRN - CNP    No specialty comments available.     Collaborating physician is Ferny Baldwin,

## 2023-12-13 NOTE — TELEPHONE ENCOUNTER
The prescription for prednisone that was written today the quantity was written for 20 tablets and it should be for 30 tablets per pharmacist according to the instructions.

## 2023-12-14 DIAGNOSIS — R09.3 ABNORMAL SPUTUM: ICD-10-CM

## 2023-12-15 RX ORDER — PREDNISONE 20 MG/1
20 TABLET ORAL DAILY
Qty: 20 TABLET | Refills: 0 | Status: SHIPPED | OUTPATIENT
Start: 2023-12-15

## 2023-12-20 ENCOUNTER — OFFICE VISIT (OUTPATIENT)
Dept: FAMILY MEDICINE CLINIC | Facility: CLINIC | Age: 58
End: 2023-12-20
Payer: COMMERCIAL

## 2023-12-20 VITALS
OXYGEN SATURATION: 95 % | HEART RATE: 83 BPM | HEIGHT: 71 IN | TEMPERATURE: 97.9 F | SYSTOLIC BLOOD PRESSURE: 110 MMHG | DIASTOLIC BLOOD PRESSURE: 86 MMHG | BODY MASS INDEX: 27.32 KG/M2 | WEIGHT: 195.13 LBS

## 2023-12-20 DIAGNOSIS — R53.82 CHRONIC FATIGUE: Primary | ICD-10-CM

## 2023-12-20 DIAGNOSIS — Z11.59 NEED FOR HEPATITIS C SCREENING TEST: ICD-10-CM

## 2023-12-20 LAB
ALBUMIN SERPL-MCNC: 4.4 G/DL (ref 3.5–5)
ALBUMIN/GLOB SERPL: 1.8 (ref 0.4–1.6)
ALP SERPL-CCNC: 81 U/L (ref 50–136)
ALT SERPL-CCNC: 56 U/L (ref 12–65)
ANION GAP SERPL CALC-SCNC: 3 MMOL/L (ref 2–11)
AST SERPL-CCNC: 28 U/L (ref 15–37)
BASOPHILS # BLD: 0.1 K/UL (ref 0–0.2)
BASOPHILS NFR BLD: 2 % (ref 0–2)
BILIRUB SERPL-MCNC: 0.5 MG/DL (ref 0.2–1.1)
BUN SERPL-MCNC: 18 MG/DL (ref 6–23)
CALCIUM SERPL-MCNC: 9.5 MG/DL (ref 8.3–10.4)
CHLORIDE SERPL-SCNC: 106 MMOL/L (ref 103–113)
CO2 SERPL-SCNC: 31 MMOL/L (ref 21–32)
CREAT SERPL-MCNC: 1 MG/DL (ref 0.8–1.5)
DIFFERENTIAL METHOD BLD: ABNORMAL
EOSINOPHIL # BLD: 0.3 K/UL (ref 0–0.8)
EOSINOPHIL NFR BLD: 9 % (ref 0.5–7.8)
ERYTHROCYTE [DISTWIDTH] IN BLOOD BY AUTOMATED COUNT: 13.7 % (ref 11.9–14.6)
FERRITIN SERPL-MCNC: 114 NG/ML (ref 8–388)
GLOBULIN SER CALC-MCNC: 2.5 G/DL (ref 2.8–4.5)
GLUCOSE SERPL-MCNC: 100 MG/DL (ref 65–100)
HCT VFR BLD AUTO: 49.2 % (ref 41.1–50.3)
HGB BLD-MCNC: 16.4 G/DL (ref 13.6–17.2)
IMM GRANULOCYTES # BLD AUTO: 0 K/UL (ref 0–0.5)
IMM GRANULOCYTES NFR BLD AUTO: 0 % (ref 0–5)
LYMPHOCYTES # BLD: 0.7 K/UL (ref 0.5–4.6)
LYMPHOCYTES NFR BLD: 18 % (ref 13–44)
MCH RBC QN AUTO: 30.4 PG (ref 26.1–32.9)
MCHC RBC AUTO-ENTMCNC: 33.3 G/DL (ref 31.4–35)
MCV RBC AUTO: 91.3 FL (ref 82–102)
MONOCYTES # BLD: 0.3 K/UL (ref 0.1–1.3)
MONOCYTES NFR BLD: 8 % (ref 4–12)
NEUTS SEG # BLD: 2.3 K/UL (ref 1.7–8.2)
NEUTS SEG NFR BLD: 63 % (ref 43–78)
NRBC # BLD: 0 K/UL (ref 0–0.2)
PLATELET # BLD AUTO: 198 K/UL (ref 150–450)
PMV BLD AUTO: 11.4 FL (ref 9.4–12.3)
POTASSIUM SERPL-SCNC: 4.3 MMOL/L (ref 3.5–5.1)
PROT SERPL-MCNC: 6.9 G/DL (ref 6.3–8.2)
RBC # BLD AUTO: 5.39 M/UL (ref 4.23–5.6)
SODIUM SERPL-SCNC: 140 MMOL/L (ref 136–146)
VIT B12 SERPL-MCNC: 1027 PG/ML (ref 193–986)
WBC # BLD AUTO: 3.7 K/UL (ref 4.3–11.1)

## 2023-12-20 PROCEDURE — 99214 OFFICE O/P EST MOD 30 MIN: CPT | Performed by: PHYSICIAN ASSISTANT

## 2023-12-20 RX ORDER — NAFTIFINE HYDROCHLORIDE 2 G/100G
GEL TOPICAL DAILY
COMMUNITY
Start: 2023-11-20

## 2023-12-20 NOTE — PROGRESS NOTES
Chief Complaint   Patient presents with    Follow-up     1 month, asthma not any better, prod cough       HISTORY OF PRESENT ILLNESS:  Oswaldo Bowden is a very pleasant 66 y.o. male who presents with a complaint of fatigue that started months ago. He feels tired all the time and often has urge to nap. He denies a hx of EZE and reports having a normal sleep study several years ago. No chronic snoring, witnessed apneic episodes, nocturnal gasping. He does have chronic insomnia- mostly difficulty staying asleep. He does not use any otc sleep aids. He was recently hospitalized for SOB and suspected asthma exacerbation, although never been diagnosed with asthma. He reports some improvement in SOB and cough per pulmonology note- was recently switched from symbicort to trelegy. Denies depressed mood or anhedonia. He has been on steroids intermittently over the last several months. His wife accompanied him to appt via phone. Also complains of recurrent rash on right side of trunk x years- saw dermatology and has tried topical and systemic steroids, antifungal creams. It gets better and then returns. Mildly itchy.      PAST MEDICAL HISTORY:   Current Outpatient Medications   Medication Sig    NAFTIN 2 % GEL daily    predniSONE (DELTASONE) 20 MG tablet Take 1 tablet by mouth daily    fluticasone-umeclidin-vilant (TRELEGY ELLIPTA) 200-62.5-25 MCG/ACT AEPB inhaler Inhale 1 puff into the lungs daily    montelukast (SINGULAIR) 10 MG tablet Take 1 tablet by mouth nightly    levalbuterol (XOPENEX HFA) 45 MCG/ACT inhaler Inhale 2 puffs into the lungs every 4 hours as needed for Wheezing    fluticasone (FLONASE) 50 MCG/ACT nasal spray 2 sprays by Nasal route daily SHAKE LIQUID AND USE 2 SPRAYS IN EACH NOSTRIL TWICE DAILY    ipratropium (ATROVENT) 0.03 % nasal spray 2 sprays by Each Nostril route 2 times daily    pantoprazole (PROTONIX) 40 MG tablet Take 1 tablet by mouth every morning (before breakfast)    levalbuterol

## 2023-12-21 LAB
25(OH)D3 SERPL-MCNC: 26.2 NG/ML (ref 30–100)
HCV AB SER QL: NONREACTIVE

## 2023-12-22 LAB — ANA SER QL: NEGATIVE

## 2023-12-28 LAB
AMIKACIN ISLT MIC: ABNORMAL
CIPROFLOXACIN ISLT MIC: ABNORMAL
CLARITHRO ISLT MIC: ABNORMAL
CLOFAZIMINE: ABNORMAL
DOXYCYCLINE: ABNORMAL
LINEZOLID ISLT MIC: ABNORMAL
MICROORGANISM/AGENT SPEC: ABNORMAL
MINOCYCLINE: ABNORMAL
MOXIFLOXACIN ISLT MIC: ABNORMAL
RIFABUTIN: ABNORMAL
RIFAMPIN ISLT MIC: ABNORMAL
SPECIMEN SOURCE: ABNORMAL
STREPTOMYCIN ISLT MIC: ABNORMAL
TRIMETHOPRIM/SULFA: ABNORMAL

## 2023-12-29 RX ORDER — TERBINAFINE HYDROCHLORIDE 250 MG/1
250 TABLET ORAL DAILY
Qty: 14 TABLET | Refills: 0 | Status: SHIPPED | OUTPATIENT
Start: 2023-12-29 | End: 2024-01-12

## 2024-01-02 DIAGNOSIS — Z12.11 SCREENING FOR COLON CANCER: Primary | ICD-10-CM

## 2024-01-10 ENCOUNTER — TELEPHONE (OUTPATIENT)
Dept: FAMILY MEDICINE CLINIC | Facility: CLINIC | Age: 59
End: 2024-01-10

## 2024-01-10 NOTE — TELEPHONE ENCOUNTER
Patient given phone # for [facility] to call & schedule the colonoscopy-referral has been placed

## 2024-01-23 ENCOUNTER — OFFICE VISIT (OUTPATIENT)
Dept: PULMONOLOGY | Age: 59
End: 2024-01-23
Payer: COMMERCIAL

## 2024-01-23 VITALS
RESPIRATION RATE: 19 BRPM | SYSTOLIC BLOOD PRESSURE: 144 MMHG | TEMPERATURE: 97.4 F | DIASTOLIC BLOOD PRESSURE: 77 MMHG | BODY MASS INDEX: 28 KG/M2 | HEART RATE: 69 BPM | HEIGHT: 71 IN | WEIGHT: 200 LBS | OXYGEN SATURATION: 97 %

## 2024-01-23 DIAGNOSIS — R06.2 WHEEZING: ICD-10-CM

## 2024-01-23 DIAGNOSIS — R63.4 UNINTENTIONAL WEIGHT LOSS: ICD-10-CM

## 2024-01-23 DIAGNOSIS — R05.3 CHRONIC COUGH: Primary | ICD-10-CM

## 2024-01-23 PROCEDURE — 99214 OFFICE O/P EST MOD 30 MIN: CPT | Performed by: NURSE PRACTITIONER

## 2024-01-23 NOTE — PROGRESS NOTES
Name:  Ruy Douglas  YOB: 1965   MRN: [id number]      Office Visit: 1/23/2024        ASSESSMENT AND PLAN:  (Medical Decision Making)    Impression: 58 y.o. male retired  for 30 years, reporting wheezing, chronic cough for 3-months, shortness of breath    1. Chronic cough  -- Much improved.  Still wheezing intermittently, so still suspect a moderate persistent asthma.  Continue Trelegy 200.  Encouraged to try albuterol prior to the Trelegy for couple days to see if this offers improvement in wheezing.  --Discussed Biologics for control, but can hold for now  -- Continue Singulair and Zyrtec, flonase, atrovent nasal sprays.  --Continue pantoprazole daily  -- Will get complete PFTs now the patient appears he could participate.    2. Unintentional weight loss  --resolved.  He did have a positive AFB on expectorated sputum.  Repeat was negative and he is gaining weight, he is not having any night sweats.  I do not suspect he has an active Mycobacterium infection, but will follow-up.    3. Wheezing  -- Some improvement in this, but continues to be a problem, waking him up at night.    No orders of the defined types were placed in this encounter.    No orders of the defined types were placed in this encounter.        Rosalina Medley, APRN - CNP    No specialty comments available.    Collaborating physician is Alessia Stroud MD    _________________________________________________________________________    HISTORY OF PRESENT ILLNESS:    Mr. Ruy Douglas is a 58 y.o. male who is seen at [facility] today for  Follow-up  Mr. Douglas is seen today for follow-up of chronic cough, concerns for asthma.  Initially referred for chronic cough, wheezing and SOB.  Suspected to be reactive airways vs asthma and was initiated on symbicort, which he didn't use.  His breathing worsened and he ended up in the hospital.  Treated with nebs, steroids, antibiotics.  CT with bronchial wall

## 2024-01-30 DIAGNOSIS — R21 RASH OF BODY: ICD-10-CM

## 2024-01-30 DIAGNOSIS — B35.9 TINEA: Primary | ICD-10-CM

## 2024-01-30 DIAGNOSIS — B35.4 TINEA OF THE BODY: Primary | ICD-10-CM

## 2024-02-15 LAB
ACID FAST STN SPEC: NEGATIVE
MYCOBACTERIUM SPEC QL CULT: NEGATIVE
SPECIMEN PREPARATION: NORMAL
SPECIMEN SOURCE: NORMAL

## 2024-03-05 ENCOUNTER — APPOINTMENT (RX ONLY)
Dept: URBAN - METROPOLITAN AREA CLINIC 329 | Facility: CLINIC | Age: 59
Setting detail: DERMATOLOGY
End: 2024-03-05

## 2024-03-05 DIAGNOSIS — L40.0 PSORIASIS VULGARIS: ICD-10-CM | Status: INADEQUATELY CONTROLLED

## 2024-03-05 DIAGNOSIS — L29.8 OTHER PRURITUS: ICD-10-CM | Status: INADEQUATELY CONTROLLED

## 2024-03-05 DIAGNOSIS — L29.89 OTHER PRURITUS: ICD-10-CM | Status: INADEQUATELY CONTROLLED

## 2024-03-05 PROBLEM — L30.9 DERMATITIS, UNSPECIFIED: Status: ACTIVE | Noted: 2024-03-05

## 2024-03-05 PROCEDURE — ? MDM - TREATMENT GOALS

## 2024-03-05 PROCEDURE — ? ADDITIONAL NOTES

## 2024-03-05 PROCEDURE — ? REFERRAL CORRESPONDENCE

## 2024-03-05 PROCEDURE — ? COUNSELING

## 2024-03-05 PROCEDURE — 96372 THER/PROPH/DIAG INJ SC/IM: CPT | Mod: 59

## 2024-03-05 PROCEDURE — 11105 PUNCH BX SKIN EA SEP/ADDL: CPT

## 2024-03-05 PROCEDURE — ? INTRAMUSCULAR KENALOG

## 2024-03-05 PROCEDURE — 11104 PUNCH BX SKIN SINGLE LESION: CPT

## 2024-03-05 PROCEDURE — ? BIOPSY BY PUNCH METHOD

## 2024-03-05 PROCEDURE — 99204 OFFICE O/P NEW MOD 45 MIN: CPT | Mod: 25

## 2024-03-05 PROCEDURE — ? PRESCRIPTION

## 2024-03-05 PROCEDURE — ? EDUCATIONAL RESOURCES PROVIDED

## 2024-03-05 PROCEDURE — ? FULL BODY SKIN EXAM - DECLINED

## 2024-03-05 PROCEDURE — ? PRESCRIPTION MEDICATION MANAGEMENT

## 2024-03-05 RX ORDER — TRIAMCINOLONE ACETONIDE 1 MG/G
CREAM TOPICAL
Qty: 454 | Refills: 3 | Status: ERX | COMMUNITY
Start: 2024-03-05

## 2024-03-05 RX ADMIN — TRIAMCINOLONE ACETONIDE: 1 CREAM TOPICAL at 00:00

## 2024-03-05 ASSESSMENT — LOCATION SIMPLE DESCRIPTION DERM
LOCATION SIMPLE: RIGHT THIGH
LOCATION SIMPLE: RIGHT BUTTOCK
LOCATION SIMPLE: RIGHT LOWER BACK
LOCATION SIMPLE: LEFT ANTERIOR NECK
LOCATION SIMPLE: ABDOMEN
LOCATION SIMPLE: LEFT THIGH
LOCATION SIMPLE: CHEST
LOCATION SIMPLE: LEFT LOWER BACK
LOCATION SIMPLE: LEFT UPPER BACK

## 2024-03-05 ASSESSMENT — LOCATION ZONE DERM
LOCATION ZONE: NECK
LOCATION ZONE: LEG
LOCATION ZONE: TRUNK

## 2024-03-05 ASSESSMENT — LOCATION DETAILED DESCRIPTION DERM
LOCATION DETAILED: LEFT LATERAL ABDOMEN
LOCATION DETAILED: RIGHT ANTERIOR PROXIMAL THIGH
LOCATION DETAILED: LEFT INFERIOR LATERAL NECK
LOCATION DETAILED: LEFT INFERIOR LATERAL MIDBACK
LOCATION DETAILED: RIGHT BUTTOCK
LOCATION DETAILED: RIGHT LATERAL ABDOMEN
LOCATION DETAILED: RIGHT SUPERIOR LATERAL LOWER BACK
LOCATION DETAILED: LEFT SUPERIOR UPPER BACK
LOCATION DETAILED: LEFT ANTERIOR PROXIMAL THIGH
LOCATION DETAILED: RIGHT LATERAL SUPERIOR CHEST

## 2024-03-05 ASSESSMENT — ITCH NUMERIC RATING SCALE: HOW SEVERE IS YOUR ITCHING?: 8

## 2024-03-05 ASSESSMENT — BSA PSORIASIS: % BODY COVERED IN PSORIASIS: 60

## 2024-03-05 ASSESSMENT — PGA PSORIASIS: PGA PSORIASIS 2020: MODERATE

## 2024-03-05 NOTE — PROCEDURE: PRESCRIPTION MEDICATION MANAGEMENT
Detail Level: Zone
Initiate Treatment: Triamcinolone cream apply twice daily for two weeks when flared
Render In Strict Bullet Format?: No
Plan: Otezla or biologic at next visit.
Initiate Treatment: Triamcinolone cream twice daily for two weeks

## 2024-03-05 NOTE — PROCEDURE: INTRAMUSCULAR KENALOG
Concentration (Mg/Ml): 40.0
Total Volume (Ccs): 2
Concentration (Mg/Ml) Of Additional Medication: 2.5
Lot # (Optional): [id number]
Kenalog Preparation: kenalog
Consent: The risks of atrophy were reviewed with the patient.
Detail Level: Simple
Expiration Date (Optional): 01/2025
Administered By (Optional): Dorothy Freire, cst
Add Option For Additional Mediation: No

## 2024-03-05 NOTE — PROCEDURE: MIPS QUALITY
Quality 431: Preventive Care And Screening: Unhealthy Alcohol Use - Screening: Patient not identified as an unhealthy alcohol user when screened for unhealthy alcohol use using a systematic screening method
Quality 226: Preventive Care And Screening: Tobacco Use: Screening And Cessation Intervention: Patient screened for tobacco use and is an ex/non-smoker
Quality 485: Psoriasis - Improvement In Patient-Reported Itch Severity: Itch severity assessment score was not reduced by at least 2 points from the initial (index) score to the follow-up visit score or assessment was not completed during the follow-up encounter
Detail Level: Detailed

## 2024-03-05 NOTE — PROCEDURE: ADDITIONAL NOTES
Additional Notes: Patient has only used anti-fungals. He has been on oral prednisone in the past, but for other issues. We will prescribe a topical steroid, along with an intramuscular kenalog injection. Due to patient having joint and nail involvement, we will probably switch to otzela or a biologic at next visit. \\n\\nBSA 60%
Render Risk Assessment In Note?: no
Detail Level: Simple
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
## 2024-03-05 NOTE — PROCEDURE: BIOPSY BY PUNCH METHOD

## 2024-03-06 RX ORDER — FLUTICASONE PROPIONATE 50 MCG
SPRAY, SUSPENSION (ML) NASAL
Qty: 16 G | Refills: 3 | OUTPATIENT
Start: 2024-03-06

## 2024-03-06 RX ORDER — PANTOPRAZOLE SODIUM 40 MG/1
40 TABLET, DELAYED RELEASE ORAL
Qty: 30 TABLET | Refills: 3 | Status: SHIPPED | OUTPATIENT
Start: 2024-03-06

## 2024-03-06 RX ORDER — FLUTICASONE PROPIONATE 50 MCG
SPRAY, SUSPENSION (ML) NASAL
Qty: 16 G | Refills: 3 | Status: SHIPPED | OUTPATIENT
Start: 2024-03-06

## 2024-03-06 RX ORDER — PANTOPRAZOLE SODIUM 40 MG/1
40 TABLET, DELAYED RELEASE ORAL
Qty: 30 TABLET | Refills: 3 | OUTPATIENT
Start: 2024-03-06

## 2024-03-20 ENCOUNTER — RX ONLY (OUTPATIENT)
Age: 59
Setting detail: RX ONLY
End: 2024-03-20

## 2024-03-20 RX ORDER — FLUCONAZOLE 150 MG/1
TABLET ORAL
Qty: 4 | Refills: 0 | Status: ERX | COMMUNITY
Start: 2024-03-20

## 2024-03-20 RX ORDER — KETOCONAZOLE 20 MG/G
CREAM TOPICAL
Qty: 60 | Refills: 3 | Status: ERX | COMMUNITY
Start: 2024-03-20

## 2024-04-04 ENCOUNTER — CLINICAL DOCUMENTATION (OUTPATIENT)
Dept: SURGERY | Age: 59
End: 2024-04-04

## 2024-04-04 NOTE — PROGRESS NOTES
Pt has no family history of colon cancer  Pt is not taking anticoagulation  Pt has no previous colonoscopies discoverable through chart review    I have reviewed the patient's chart and consider the patient an acceptable risk for screening colonoscopy without a formal office visit.  We will contact the patient to give the details of the bowel prep and to schedule screening colonoscopy in the near future.     Once the colonoscopy has been completed, the Health Maintenance will be updated accordingly.     OZ Streeter - NP

## 2024-04-05 ENCOUNTER — PREP FOR PROCEDURE (OUTPATIENT)
Dept: SURGERY | Age: 59
End: 2024-04-05

## 2024-04-05 DIAGNOSIS — Z12.11 SCREENING FOR COLON CANCER: ICD-10-CM

## 2024-04-29 RX ORDER — SODIUM CHLORIDE 9 MG/ML
INJECTION, SOLUTION INTRAVENOUS PRN
Status: CANCELLED | OUTPATIENT
Start: 2024-04-29

## 2024-04-29 RX ORDER — SODIUM CHLORIDE 0.9 % (FLUSH) 0.9 %
5-40 SYRINGE (ML) INJECTION PRN
Status: CANCELLED | OUTPATIENT
Start: 2024-04-29

## 2024-04-29 RX ORDER — SODIUM CHLORIDE 0.9 % (FLUSH) 0.9 %
5-40 SYRINGE (ML) INJECTION EVERY 12 HOURS SCHEDULED
Status: CANCELLED | OUTPATIENT
Start: 2024-04-29

## 2024-04-30 ENCOUNTER — APPOINTMENT (RX ONLY)
Dept: URBAN - METROPOLITAN AREA CLINIC 329 | Facility: CLINIC | Age: 59
Setting detail: DERMATOLOGY
End: 2024-04-30

## 2024-04-30 DIAGNOSIS — B35.4 TINEA CORPORIS: ICD-10-CM | Status: WORSENING

## 2024-04-30 PROCEDURE — ? FULL BODY SKIN EXAM - DECLINED

## 2024-04-30 PROCEDURE — ? PRESCRIPTION

## 2024-04-30 PROCEDURE — ? ADDITIONAL NOTES

## 2024-04-30 PROCEDURE — ? PRESCRIPTION MEDICATION MANAGEMENT

## 2024-04-30 PROCEDURE — 99213 OFFICE O/P EST LOW 20 MIN: CPT

## 2024-04-30 PROCEDURE — ? COUNSELING

## 2024-04-30 RX ORDER — CICLOPIROX OLAMINE 7.7 MG/G
CREAM TOPICAL
Qty: 90 | Refills: 3 | Status: ERX | COMMUNITY
Start: 2024-04-30

## 2024-04-30 RX ORDER — TERBINAFINE HYDROCHLORIDE 250 MG/1
TABLET ORAL
Qty: 30 | Refills: 0 | Status: ERX | COMMUNITY
Start: 2024-04-30

## 2024-04-30 RX ADMIN — TERBINAFINE HYDROCHLORIDE: 250 TABLET ORAL at 00:00

## 2024-04-30 RX ADMIN — CICLOPIROX OLAMINE: 7.7 CREAM TOPICAL at 00:00

## 2024-04-30 ASSESSMENT — LOCATION SIMPLE DESCRIPTION DERM
LOCATION SIMPLE: LEFT UPPER BACK
LOCATION SIMPLE: ABDOMEN
LOCATION SIMPLE: RIGHT THIGH
LOCATION SIMPLE: LEFT THIGH

## 2024-04-30 ASSESSMENT — LOCATION DETAILED DESCRIPTION DERM
LOCATION DETAILED: RIGHT ANTERIOR PROXIMAL THIGH
LOCATION DETAILED: LEFT SUPERIOR UPPER BACK
LOCATION DETAILED: LEFT ANTERIOR PROXIMAL THIGH
LOCATION DETAILED: LEFT LATERAL ABDOMEN

## 2024-04-30 ASSESSMENT — LOCATION ZONE DERM
LOCATION ZONE: TRUNK
LOCATION ZONE: LEG

## 2024-04-30 NOTE — PROCEDURE: ADDITIONAL NOTES
Render Risk Assessment In Note?: no
Detail Level: Simple
Additional Notes: If patient isn’t improving by next visit, we will refer to infectious disease.

## 2024-04-30 NOTE — PROCEDURE: PRESCRIPTION MEDICATION MANAGEMENT
Initiate Treatment: ciclopirox 0.77 % topical cream - Apply to affected areas twice daily\\n\\nterbinafine HCl 250 mg tablet - Take one pill once daily for two to four weeks. Take with fatty foods.
Discontinue Regimen: Ketoconazole cream\\n\\nTriamcinolone cream\\n\\nFluconazole
Render In Strict Bullet Format?: No
Plan: Refer to infectious disease if not improving
Detail Level: Zone

## 2024-05-05 PROBLEM — Z12.11 SCREENING FOR COLON CANCER: Status: RESOLVED | Noted: 2024-04-05 | Resolved: 2024-05-05

## 2024-05-09 PROBLEM — Z12.11 SCREENING FOR COLON CANCER: Status: ACTIVE | Noted: 2024-04-05

## 2024-05-17 NOTE — PROGRESS NOTES
Patient verified name, , and procedure.    Type: 1a; abbreviated assessment per anesthesia guidelines    Labs per anesthesia: none    Instructed pt that they will be notified the day before their procedure by the GI Lab for time of arrival if their procedure is [place] and Pre-op for [place] cases. Arrival times should be called by 5 pm. If no phone is received the patient should contact their respective hospital. The GI lab telephone number is [phone] and ES Pre-op is [phone].     Follow diet and prep instructions per office including NPO status.      Bath or shower the night before and the am of surgery with non-moisturizing soap. No lotions, oils, powders, cologne on skin. No make up, eye make up or jewelry. Wear loose fitting comfortable, clean clothing.     Must have adult present in building the entire time .     Medications for the day of procedure use/bring:  Flonase nasal spray, Trelegy Ellipta; bring Xopenex inhaler, patient to hold none per anesthesia guidelines.     The following discharge instructions reviewed with patient: medication given during procedure may cause drowsiness for several hours, therefore, do not drive or operate machinery for remainder of the day. You may not drink alcohol on the day of your procedure, please resume regular diet and activity unless otherwise directed. You may experience abdominal distention for several hours that is relieved by the passage of gas. Contact your physician if you have any of the following: fever or chills, severe abdominal pain or excessive amount of bleeding or a large amount when having a bowel movement. Occasional specks of blood with bowel movement would not be unusual.

## 2024-05-21 ENCOUNTER — NURSE ONLY (OUTPATIENT)
Dept: PULMONOLOGY | Age: 59
End: 2024-05-21
Payer: COMMERCIAL

## 2024-05-21 ENCOUNTER — OFFICE VISIT (OUTPATIENT)
Dept: PULMONOLOGY | Age: 59
End: 2024-05-21
Payer: COMMERCIAL

## 2024-05-21 VITALS
OXYGEN SATURATION: 98 % | HEART RATE: 55 BPM | TEMPERATURE: 97.5 F | SYSTOLIC BLOOD PRESSURE: 121 MMHG | RESPIRATION RATE: 20 BRPM | DIASTOLIC BLOOD PRESSURE: 79 MMHG | HEIGHT: 71 IN | BODY MASS INDEX: 26.6 KG/M2 | WEIGHT: 190 LBS

## 2024-05-21 DIAGNOSIS — J30.89 ENVIRONMENTAL AND SEASONAL ALLERGIES: ICD-10-CM

## 2024-05-21 DIAGNOSIS — R05.3 CHRONIC COUGH: Primary | ICD-10-CM

## 2024-05-21 LAB
FEV 1 , POC: 3.7 L
FEV1 % PRED, POC: 96 %
FEV1/FVC, POC: NORMAL
FVC % PRED, POC: 95 %
FVC, POC: NORMAL

## 2024-05-21 PROCEDURE — 99214 OFFICE O/P EST MOD 30 MIN: CPT | Performed by: NURSE PRACTITIONER

## 2024-05-21 PROCEDURE — 94060 EVALUATION OF WHEEZING: CPT | Performed by: INTERNAL MEDICINE

## 2024-05-21 PROCEDURE — 94729 DIFFUSING CAPACITY: CPT | Performed by: INTERNAL MEDICINE

## 2024-05-21 PROCEDURE — 94726 PLETHYSMOGRAPHY LUNG VOLUMES: CPT | Performed by: INTERNAL MEDICINE

## 2024-05-21 ASSESSMENT — PULMONARY FUNCTION TESTS
FEV1_PERCENT_PREDICTED_POC: 96
FVC_PERCENT_PREDICTED_POC: 95

## 2024-05-21 NOTE — PROGRESS NOTES
Nebulization, EVERY 4 HOURS PRN    NAFTIN 2 % GEL DAILY    pantoprazole (PROTONIX) 40 mg, Oral, DAILY BEFORE BREAKFAST    predniSONE (DELTASONE) 20 mg, Oral, DAILY

## 2024-05-28 ENCOUNTER — ANESTHESIA (OUTPATIENT)
Dept: ENDOSCOPY | Age: 59
End: 2024-05-28
Payer: COMMERCIAL

## 2024-05-28 ENCOUNTER — ANESTHESIA EVENT (OUTPATIENT)
Dept: ENDOSCOPY | Age: 59
End: 2024-05-28
Payer: COMMERCIAL

## 2024-05-28 ENCOUNTER — HOSPITAL ENCOUNTER (OUTPATIENT)
Age: 59
Setting detail: OUTPATIENT SURGERY
Discharge: HOME | End: 2024-05-28
Attending: SURGERY | Admitting: SURGERY
Payer: COMMERCIAL

## 2024-05-28 VITALS
TEMPERATURE: 97.8 F | OXYGEN SATURATION: 97 % | RESPIRATION RATE: 19 BRPM | HEIGHT: 71 IN | HEART RATE: 52 BPM | WEIGHT: 188.27 LBS | SYSTOLIC BLOOD PRESSURE: 128 MMHG | BODY MASS INDEX: 26.36 KG/M2 | DIASTOLIC BLOOD PRESSURE: 76 MMHG

## 2024-05-28 PROCEDURE — 2500000003 HC RX 250 WO HCPCS: Performed by: REGISTERED NURSE

## 2024-05-28 PROCEDURE — 45378 DIAGNOSTIC COLONOSCOPY: CPT | Performed by: SURGERY

## 2024-05-28 PROCEDURE — 3700000001 HC ADD 15 MINUTES (ANESTHESIA): Performed by: SURGERY

## 2024-05-28 PROCEDURE — 7100000011 HC PHASE II RECOVERY - ADDTL 15 MIN: Performed by: SURGERY

## 2024-05-28 PROCEDURE — 2580000003 HC RX 258: Performed by: ANESTHESIOLOGY

## 2024-05-28 PROCEDURE — 3609027000 HC COLONOSCOPY: Performed by: SURGERY

## 2024-05-28 PROCEDURE — 6360000002 HC RX W HCPCS: Performed by: REGISTERED NURSE

## 2024-05-28 PROCEDURE — 7100000010 HC PHASE II RECOVERY - FIRST 15 MIN: Performed by: SURGERY

## 2024-05-28 PROCEDURE — 2709999900 HC NON-CHARGEABLE SUPPLY: Performed by: SURGERY

## 2024-05-28 PROCEDURE — 3700000000 HC ANESTHESIA ATTENDED CARE: Performed by: SURGERY

## 2024-05-28 RX ORDER — SODIUM CHLORIDE, SODIUM LACTATE, POTASSIUM CHLORIDE, CALCIUM CHLORIDE 600; 310; 30; 20 MG/100ML; MG/100ML; MG/100ML; MG/100ML
INJECTION, SOLUTION INTRAVENOUS CONTINUOUS
Status: DISCONTINUED | OUTPATIENT
Start: 2024-05-28 | End: 2024-05-28 | Stop reason: HOSPADM

## 2024-05-28 RX ORDER — SODIUM CHLORIDE 0.9 % (FLUSH) 0.9 %
5-40 SYRINGE (ML) INJECTION PRN
Status: DISCONTINUED | OUTPATIENT
Start: 2024-05-28 | End: 2024-05-28 | Stop reason: HOSPADM

## 2024-05-28 RX ORDER — LIDOCAINE HYDROCHLORIDE 20 MG/ML
INJECTION, SOLUTION EPIDURAL; INFILTRATION; INTRACAUDAL; PERINEURAL PRN
Status: DISCONTINUED | OUTPATIENT
Start: 2024-05-28 | End: 2024-05-28 | Stop reason: SDUPTHER

## 2024-05-28 RX ORDER — SODIUM CHLORIDE 9 MG/ML
INJECTION, SOLUTION INTRAVENOUS PRN
Status: DISCONTINUED | OUTPATIENT
Start: 2024-05-28 | End: 2024-05-28 | Stop reason: HOSPADM

## 2024-05-28 RX ORDER — NALOXONE HYDROCHLORIDE 0.4 MG/ML
INJECTION, SOLUTION INTRAMUSCULAR; INTRAVENOUS; SUBCUTANEOUS PRN
Status: DISCONTINUED | OUTPATIENT
Start: 2024-05-28 | End: 2024-05-28 | Stop reason: HOSPADM

## 2024-05-28 RX ORDER — SODIUM CHLORIDE 0.9 % (FLUSH) 0.9 %
5-40 SYRINGE (ML) INJECTION PRN
Status: DISCONTINUED | OUTPATIENT
Start: 2024-05-28 | End: 2024-05-28 | Stop reason: SDUPTHER

## 2024-05-28 RX ORDER — PROPOFOL 10 MG/ML
INJECTION, EMULSION INTRAVENOUS PRN
Status: DISCONTINUED | OUTPATIENT
Start: 2024-05-28 | End: 2024-05-28 | Stop reason: SDUPTHER

## 2024-05-28 RX ORDER — SODIUM CHLORIDE 0.9 % (FLUSH) 0.9 %
5-40 SYRINGE (ML) INJECTION EVERY 12 HOURS SCHEDULED
Status: DISCONTINUED | OUTPATIENT
Start: 2024-05-28 | End: 2024-05-28 | Stop reason: SDUPTHER

## 2024-05-28 RX ORDER — SODIUM CHLORIDE 9 MG/ML
INJECTION, SOLUTION INTRAVENOUS PRN
Status: DISCONTINUED | OUTPATIENT
Start: 2024-05-28 | End: 2024-05-28 | Stop reason: SDUPTHER

## 2024-05-28 RX ORDER — LIDOCAINE HYDROCHLORIDE 10 MG/ML
1 INJECTION, SOLUTION INFILTRATION; PERINEURAL
Status: DISCONTINUED | OUTPATIENT
Start: 2024-05-28 | End: 2024-05-28 | Stop reason: HOSPADM

## 2024-05-28 RX ORDER — SODIUM CHLORIDE 0.9 % (FLUSH) 0.9 %
5-40 SYRINGE (ML) INJECTION EVERY 12 HOURS SCHEDULED
Status: DISCONTINUED | OUTPATIENT
Start: 2024-05-28 | End: 2024-05-28 | Stop reason: HOSPADM

## 2024-05-28 RX ADMIN — PROPOFOL 250 MCG/KG/MIN: 10 INJECTION, EMULSION INTRAVENOUS at 09:10

## 2024-05-28 RX ADMIN — LIDOCAINE HYDROCHLORIDE 30 MG: 20 INJECTION, SOLUTION EPIDURAL; INFILTRATION; INTRACAUDAL; PERINEURAL at 09:08

## 2024-05-28 RX ADMIN — SODIUM CHLORIDE, POTASSIUM CHLORIDE, SODIUM LACTATE AND CALCIUM CHLORIDE: 600; 310; 30; 20 INJECTION, SOLUTION INTRAVENOUS at 08:20

## 2024-05-28 RX ADMIN — PROPOFOL 100 MG: 10 INJECTION, EMULSION INTRAVENOUS at 09:08

## 2024-05-28 ASSESSMENT — PAIN SCALES - GENERAL: PAINLEVEL_OUTOF10: 0

## 2024-05-28 NOTE — ANESTHESIA PRE PROCEDURE
TONSILLECTOMY  age 8       Social History:    Social History     Tobacco Use   • Smoking status: Never     Passive exposure: Never   • Smokeless tobacco: Current     Types: Snuff   Substance Use Topics   • Alcohol use: Yes     Comment: occasionally                                Ready to quit: Not Answered  Counseling given: Not Answered      Vital Signs (Current):   Vitals:    05/17/24 0937 05/28/24 0757   BP:  127/84   Pulse:  55   Resp:  18   SpO2:  97%   Weight: 86.2 kg (190 lb) 85.4 kg (188 lb 4.4 oz)   Height: 1.803 m (5' 11\")                                               BP Readings from Last 3 Encounters:   05/28/24 127/84   05/21/24 121/79   01/23/24 (!) 144/77       NPO Status: Time of last liquid consumption: 2300                        Time of last solid consumption: 1800                        Date of last liquid consumption: 05/27/24                        Date of last solid food consumption: 05/26/24    BMI:   Wt Readings from Last 3 Encounters:   05/28/24 85.4 kg (188 lb 4.4 oz)   05/21/24 86.2 kg (190 lb)   01/23/24 90.7 kg (200 lb)     Body mass index is 26.26 kg/m².    CBC:   Lab Results   Component Value Date/Time    WBC 3.7 12/20/2023 09:20 AM    RBC 5.39 12/20/2023 09:20 AM    HGB 16.4 12/20/2023 09:20 AM    HCT 49.2 12/20/2023 09:20 AM    MCV 91.3 12/20/2023 09:20 AM    RDW 13.7 12/20/2023 09:20 AM     12/20/2023 09:20 AM       CMP:   Lab Results   Component Value Date/Time     12/20/2023 09:20 AM    K 4.3 12/20/2023 09:20 AM     12/20/2023 09:20 AM    CO2 31 12/20/2023 09:20 AM    BUN 18 12/20/2023 09:20 AM    CREATININE 1.00 12/20/2023 09:20 AM    LABGLOM >60 12/20/2023 09:20 AM    GLUCOSE 100 12/20/2023 09:20 AM    CALCIUM 9.5 12/20/2023 09:20 AM    BILITOT 0.5 12/20/2023 09:20 AM    ALKPHOS 81 12/20/2023 09:20 AM    AST 28 12/20/2023 09:20 AM    ALT 56 12/20/2023 09:20 AM       POC Tests: No results for input(s): \"POCGLU\", \"POCNA\", \"POCK\", \"POCCL\", \"POCBUN\", \"POCHEMO\",

## 2024-05-28 NOTE — H&P
Lincoln SALOMON Jonathan R, MD    ALLERGY:    Allergies   Allergen Reactions    Penicillins Rash         ROS: The patient has no difficulty with chest pain or shortness of breath.  No fever or chills.  The patient denies any personal or family history of abnormal clotting or bleeding.  Comprehensive review of systems was otherwise unremarkable except as noted above.    Physical Exam:   Constitutional: Alert oriented cooperative patient in no acute distress.   /84   Pulse 55   Resp 18   Ht 1.803 m (5' 11\")   Wt 85.4 kg (188 lb 4.4 oz)   SpO2 97%   BMI 26.26 kg/m²   Eyes:Sclera are clear without icterus.   ENMT: no obvious neck masses, no ear or lip lesions  CV: RRR. Normal perfusion  Resp: No JVD.  Breathing is  non-labored.    Abd: soft and non-distended     Musculoskeletal: unremarkable with normal function.   Neuro:  No obvious focal deficits  Psychiatric: normal affect and mood, no memory impairment    Lab Results   Component Value Date/Time    WBC 3.7 12/20/2023 09:20 AM    HGB 16.4 12/20/2023 09:20 AM    HCT 49.2 12/20/2023 09:20 AM     12/20/2023 09:20 AM    MCV 91.3 12/20/2023 09:20 AM       Lab Results   Component Value Date/Time     12/20/2023 09:20 AM    K 4.3 12/20/2023 09:20 AM     12/20/2023 09:20 AM    CO2 31 12/20/2023 09:20 AM    BUN 18 12/20/2023 09:20 AM    ALT 56 12/20/2023 09:20 AM         Assessment:     Ruy Douglas is a 59 y.o. male who presents for colonoscopy via direct scheduling.      Plan:    The patient is appropriate for the pediatric colonoscope.  We discussed proceeding with colonoscopy.  I discussed the patient's condition and treatment options with the patient. I discussed risks of colonoscopy in language the patient could understand including bleeding, infection, aspiration, perforation, medication reaction, need for further endoscopy or surgery, abscess, fistula, SBO, DVT, PE, heart attack, stroke, renal failure, respiratory failure,

## 2024-05-28 NOTE — DISCHARGE INSTRUCTIONS
Gastrointestinal Colonoscopy/Flexible Sigmoidoscopy - Lower Exam Discharge Instructions  Call Dr. Pineda at [phone] for any problems or questions.    Contact the doctor’s office for follow up appointment as directed    Medication may cause drowsiness for several hours, therefore:  Do not drive or operate machinery for reminder of the day.  No alcohol today.  Do not make any important or legal decisions for 24 hours.  Do not sign any legal documents for 24 hours.    Ordinarily, you may resume regular diet and activity after exam unless otherwise specified by your physician.    Because of air put into your colon during exam, you may experience some abdominal distension, relieved by the passage of gas, for several hours.    Contact your physician if you have any of the following:  Excessive amount of bleeding - large amount when having a bowel movement.  Occasional specks of blood with bowel movement would not be unusual.  Severe abdominal pain  Fever or Chills    Polyp Removal - follow these additional instructions  Take Metamucil - 1 tablespoon in juice every morning for 3 days  No Aspirin, Advil, Aleve, Nuprin, Ibuprofen, or medications that contain these drugs for 2 weeks.      Any additional instructions:    Follow up in 10 years for repeat colonoscopy  Follow up with PCP on a normal schedule      Instructions Following Ambulatory Surgery    Activity   As tolerated and directed by your doctor   Bathe or shower as directed by your doctor    Diet   Clear liquids until no nausea or vomiting; then light diet for the first day   Advance to regular diet on second day, unless your doctor orders otherwise   If nausea and vomiting continues, call your doctor    Pain   Take pain medication as directed by your doctor    Call your doctor if pain is NOT relieved by medication   DO NOT take aspirin or blood thinners until directed by your doctor    Follow-Up Phone Calls   Will be made nursing staff   If you have any

## 2024-05-28 NOTE — ANESTHESIA POSTPROCEDURE EVALUATION
Department of Anesthesiology  Postprocedure Note    Patient: Ruy Douglas  MRN: [id number]  YOB: 1965  Date of evaluation: 5/28/2024    Procedure Summary       Date: 05/28/24 Room / Location: [facility] ENDO 01 / [facility]    Anesthesia Start: 0905 Anesthesia Stop: 0928    Procedure: COLORECTAL CANCER SCREENING, NOT HIGH RISK Diagnosis:       Screening for colon cancer      (Screening for colon cancer [Z12.11])    Surgeons: Gaby Pineda MD Responsible Provider: Gabriel Stark MD    Anesthesia Type: TIVA ASA Status: 2            Anesthesia Type: No value filed.    Nidia Phase I:      Nidia Phase II: Nidia Score: 10    Anesthesia Post Evaluation    Patient location during evaluation: PACU  Patient participation: complete - patient participated  Level of consciousness: awake and alert  Airway patency: patent  Nausea & Vomiting: no nausea and no vomiting  Cardiovascular status: hemodynamically stable  Respiratory status: acceptable, nonlabored ventilation and spontaneous ventilation  Hydration status: euvolemic  Comments: /76   Pulse 52   Temp 97.8 °F (36.6 °C) (Temporal)   Resp 19   Ht 1.803 m (5' 11\")   Wt 85.4 kg (188 lb 4.4 oz)   SpO2 97%   BMI 26.26 kg/m²     Multimodal analgesia pain management approach  Pain management: adequate and satisfactory to patient    No notable events documented.

## 2024-05-28 NOTE — OP NOTE
[place] [address]  [phone]    Colonoscopy Procedure Note    Name: Ruy Douglas     Date: 5/28/2024   Med Record Number: [id number]   Age: 59 y.o.   Sex: male   Procedure: Colonoscopy --screening  Pre-operative Diagnosis:  Screening  Post-operative Diagnosis: Same  Indications: screening for colon cancer  Anesthesia/Sedation: MAC IV MAC anesthesia Propofol  Procedure Details:    Informed consent was obtained for the procedure, including sedation.  Risks of perforation, hemorrhage, adverse drug reaction and aspiration were discussed. The patient was placed in the left lateral decubitus position.  Based on the pre-procedure assessment, including review of the patient's medical history, medications, allergies, and review of systems, he had been deemed to be an appropriate candidate for sedation by the Anesthesia Dept.   The patient was monitored continuously with ECG tracing, pulse oximetry, blood pressure monitoring, and direct observations.      A time out was performed.  Once sedation was adequate, a rectal examination was performed.  The [id number] was inserted into the rectum and advanced under direct vision to the cecum, which was identified by the ileocecal valve and appendiceal orifice.  The quality of the colonic preparation was excellent.  A careful inspection was made as the colonoscope was withdrawn, including a retroflexed view of the rectum; findings and interventions are described below.  Appropriate photodocumentation was obtained.    Findings:   ANUS: Anal exam reveals no masses or hemorrhoids, sphincter tone is normal.   RECTUM: Rectal exam reveals no masses or hemorrhoids.   SIGMOID COLON: The mucosa is normal with good vascular pattern and without ulcers, diverticula, and polyps.   DESCENDING COLON: The mucosa is normal with good vascular pattern and without ulcers, diverticula, and polyps.   TRANSVERSE COLON: The mucosa is normal

## 2024-06-08 PROBLEM — Z12.11 SCREENING FOR COLON CANCER: Status: RESOLVED | Noted: 2024-04-05 | Resolved: 2024-06-08

## 2024-06-11 ENCOUNTER — APPOINTMENT (RX ONLY)
Dept: URBAN - METROPOLITAN AREA CLINIC 329 | Facility: CLINIC | Age: 59
Setting detail: DERMATOLOGY
End: 2024-06-11

## 2024-06-11 PROBLEM — B35.9 DERMATOPHYTOSIS, UNSPECIFIED: Status: ACTIVE | Noted: 2024-06-11

## 2024-06-11 PROCEDURE — ? FULL BODY SKIN EXAM - DECLINED

## 2024-06-11 PROCEDURE — ? ADDITIONAL NOTES

## 2024-06-11 PROCEDURE — ? REFERRAL

## 2024-06-11 PROCEDURE — 99213 OFFICE O/P EST LOW 20 MIN: CPT

## 2024-06-11 PROCEDURE — ? PRESCRIPTION MEDICATION MANAGEMENT

## 2024-06-11 PROCEDURE — ? COUNSELING

## 2024-06-11 NOTE — PROCEDURE: ADDITIONAL NOTES
Render Risk Assessment In Note?: no
Detail Level: Simple
Additional Notes: Biopsy proven dermatophytosis. Previously treated with corticosteroids. Present for over a year per patient. Patient continues to itch constantly. Patient has tried and failed: Ciclopirox cream, Oral Terbinafine, Ketoconazole cream, Oral Fluconazole. Refer to infectious disease for further management.

## 2024-06-11 NOTE — PROCEDURE: PRESCRIPTION MEDICATION MANAGEMENT
Discontinue Regimen: Ciclopirox cream, terbinafine tablets
Render In Strict Bullet Format?: No
Detail Level: Zone

## 2024-12-26 RX ORDER — PREDNISONE 10 MG/1
TABLET ORAL
Qty: 30 TABLET | Refills: 0 | Status: SHIPPED | OUTPATIENT
Start: 2024-12-26

## 2024-12-26 RX ORDER — LEVALBUTEROL TARTRATE 45 UG/1
2 AEROSOL, METERED ORAL EVERY 4 HOURS PRN
Qty: 1 EACH | Refills: 11 | Status: SHIPPED | OUTPATIENT
Start: 2024-12-26 | End: 2025-12-26

## 2024-12-26 RX ORDER — FLUTICASONE FUROATE, UMECLIDINIUM BROMIDE AND VILANTEROL TRIFENATATE 200; 62.5; 25 UG/1; UG/1; UG/1
1 POWDER RESPIRATORY (INHALATION) DAILY
Qty: 1 EACH | Refills: 11 | Status: SHIPPED | OUTPATIENT
Start: 2024-12-26

## 2024-12-26 NOTE — TELEPHONE ENCOUNTER
Called patient and he stated that he has had a cough, SOB. Wife stated the cough has been going on for 2 weeks. She stated he was hospitalized last year from this cough. OTC medications includes Mucinex, Flonase. Patient states he is not coughing anything up. He feels like his reflux is well managed at this time.     When wife stated that he had tried prednisone before, works but it comes right back after stopping treatment. Levalbuterol helps, but doesn't subside.     No fevers, stating lungs are flared up. She also no refills either and is wanting refills for Trelegy and Levalbuterol as well.     He was last seen on May 21, 2024.

## 2024-12-26 NOTE — TELEPHONE ENCOUNTER
Will put in the requested refills but also put in for a 40mg prednisone taper. He may need to start on injectable therapy at his upcoming appointment.     James Obrien MD

## 2025-01-09 ENCOUNTER — OFFICE VISIT (OUTPATIENT)
Dept: PULMONOLOGY | Age: 60
End: 2025-01-09
Payer: COMMERCIAL

## 2025-01-09 ENCOUNTER — HOSPITAL ENCOUNTER (OUTPATIENT)
Dept: GENERAL RADIOLOGY | Age: 60
Discharge: HOME | End: 2025-01-12
Payer: COMMERCIAL

## 2025-01-09 VITALS
DIASTOLIC BLOOD PRESSURE: 86 MMHG | HEART RATE: 64 BPM | OXYGEN SATURATION: 96 % | RESPIRATION RATE: 18 BRPM | BODY MASS INDEX: 27.44 KG/M2 | SYSTOLIC BLOOD PRESSURE: 129 MMHG | HEIGHT: 71 IN | TEMPERATURE: 97.5 F | WEIGHT: 196 LBS

## 2025-01-09 DIAGNOSIS — R05.2 SUBACUTE COUGH: ICD-10-CM

## 2025-01-09 DIAGNOSIS — R50.9 FEVER, UNSPECIFIED FEVER CAUSE: ICD-10-CM

## 2025-01-09 DIAGNOSIS — J45.41 MODERATE PERSISTENT ASTHMA WITH EXACERBATION: ICD-10-CM

## 2025-01-09 DIAGNOSIS — J45.41 MODERATE PERSISTENT ASTHMA WITH EXACERBATION: Primary | ICD-10-CM

## 2025-01-09 DIAGNOSIS — J30.89 ENVIRONMENTAL AND SEASONAL ALLERGIES: ICD-10-CM

## 2025-01-09 LAB
BASOPHILS # BLD: 0.04 K/UL (ref 0–0.2)
BASOPHILS NFR BLD: 0.7 % (ref 0–2)
DIFFERENTIAL METHOD BLD: ABNORMAL
EOSINOPHIL # BLD: 0.32 K/UL (ref 0–0.8)
EOSINOPHIL NFR BLD: 5.8 % (ref 0.5–7.8)
ERYTHROCYTE [DISTWIDTH] IN BLOOD BY AUTOMATED COUNT: 14.3 % (ref 11.9–14.6)
HCT VFR BLD AUTO: 51.9 % (ref 41.1–50.3)
HGB BLD-MCNC: 17.1 G/DL (ref 13.6–17.2)
IMM GRANULOCYTES # BLD AUTO: 0.02 K/UL (ref 0–0.5)
IMM GRANULOCYTES NFR BLD AUTO: 0.4 % (ref 0–5)
LYMPHOCYTES # BLD: 2.1 K/UL (ref 0.5–4.6)
LYMPHOCYTES NFR BLD: 38 % (ref 13–44)
MCH RBC QN AUTO: 29 PG (ref 26.1–32.9)
MCHC RBC AUTO-ENTMCNC: 32.9 G/DL (ref 31.4–35)
MCV RBC AUTO: 88.1 FL (ref 82–102)
MONOCYTES # BLD: 0.45 K/UL (ref 0.1–1.3)
MONOCYTES NFR BLD: 8.2 % (ref 4–12)
NEUTS SEG # BLD: 2.59 K/UL (ref 1.7–8.2)
NEUTS SEG NFR BLD: 46.9 % (ref 43–78)
NRBC # BLD: 0 K/UL (ref 0–0.2)
PLATELET # BLD AUTO: 219 K/UL (ref 150–450)
PMV BLD AUTO: 11.4 FL (ref 9.4–12.3)
PROCALCITONIN SERPL-MCNC: 0.04 NG/ML (ref 0–0.1)
RBC # BLD AUTO: 5.89 M/UL (ref 4.23–5.6)
WBC # BLD AUTO: 5.5 K/UL (ref 4.3–11.1)

## 2025-01-09 PROCEDURE — 99214 OFFICE O/P EST MOD 30 MIN: CPT | Performed by: NURSE PRACTITIONER

## 2025-01-09 PROCEDURE — 71046 X-RAY EXAM CHEST 2 VIEWS: CPT

## 2025-01-09 RX ORDER — LEVALBUTEROL INHALATION SOLUTION 1.25 MG/3ML
1.25 SOLUTION RESPIRATORY (INHALATION) EVERY 4 HOURS PRN
Qty: 240 EACH | Refills: 5 | Status: SHIPPED | OUTPATIENT
Start: 2025-01-09

## 2025-01-09 RX ORDER — PREDNISONE 10 MG/1
TABLET ORAL
Qty: 30 TABLET | Refills: 0 | Status: SHIPPED | OUTPATIENT
Start: 2025-01-09

## 2025-01-09 RX ORDER — TAMSULOSIN HYDROCHLORIDE 0.4 MG/1
0.4 CAPSULE ORAL DAILY
Qty: 30 CAPSULE | Refills: 3 | Status: SHIPPED | OUTPATIENT
Start: 2025-01-09

## 2025-01-09 RX ORDER — AZITHROMYCIN 250 MG/1
TABLET, FILM COATED ORAL
Qty: 6 TABLET | Refills: 0 | Status: SHIPPED | OUTPATIENT
Start: 2025-01-09 | End: 2025-01-19

## 2025-01-09 NOTE — PROGRESS NOTES
cetirizine (ZYRTEC) 10 mg, Oral, DAILY    econazole nitrate 1 % cream Topical, DAILY    fluticasone (FLONASE) 50 MCG/ACT nasal spray SHAKE WELL AND USE 2 SPRAYS IN EACH NOSTRIL TWICE DAILY    fluticasone-umeclidin-vilant (TRELEGY ELLIPTA) 200-62.5-25 MCG/ACT AEPB inhaler 1 puff, Inhalation, DAILY    levalbuterol (XOPENEX HFA) 45 MCG/ACT inhaler 2 puffs, Inhalation, EVERY 4 HOURS PRN    levalbuterol (XOPENEX) 1.25 mg, Nebulization, EVERY 4 HOURS PRN    NAFTIN 2 % GEL DAILY    pantoprazole (PROTONIX) 40 mg, Oral, DAILY BEFORE BREAKFAST    predniSONE (DELTASONE) 10 MG tablet Take 4 tabs x 3 days then 3 tabs x 3 days then 2 tabs x 3 days then 1 tab x 3 days then stop.    predniSONE (DELTASONE) 10 MG tablet Take 4 tabs x 3 days then 3 tabs x 3 days then 2 tabs x 3 days then 1 tab x 3 days then stop.    tamsulosin (FLOMAX) 0.4 mg, Oral, DAILY

## 2025-01-12 LAB — IGE SERPL-ACNC: 568 IU/ML (ref 6–495)

## 2025-01-16 ENCOUNTER — TELEPHONE (OUTPATIENT)
Dept: PULMONOLOGY | Age: 60
End: 2025-01-16

## 2025-01-16 NOTE — TELEPHONE ENCOUNTER
----- Message from OZ Benz CNP sent at 1/10/2025 11:57 AM EST -----  Ashley can we call him and get a follow up appt in 6 weeks or so?

## 2025-01-16 NOTE — TELEPHONE ENCOUNTER
The patient was contacted and an appointment was scheduled with Ms. Rosalina Medley on February 19, 2024, at 11:20 AM. He expressed agreement and voices understanding. Jose THOMAS

## 2025-01-28 ENCOUNTER — TELEPHONE (OUTPATIENT)
Dept: PULMONOLOGY | Age: 60
End: 2025-01-28

## 2025-01-28 NOTE — TELEPHONE ENCOUNTER
Patients wife Camilla says that his breathing has not gotten any better. He can't talk with out lossing his breath. Has a bad cough. He is doing his nebulizer up to 3 times aday. . Has had dizziness

## 2025-01-28 NOTE — TELEPHONE ENCOUNTER
Last seen: 1/9/25  Hx: asthma, subacute cough, seasonal & environmental allergies    Plan from last visit w/ clear CXR & labs checked, continue antibiotics & steroids f/u in 6 weeks. Set up for 2/19/25.    Spouse calling to report breathing has not improved, cannot talk without losing breath, continues coughing, has dizziness, using nebulizer TID.    Contacted spouse about symptoms, she reports he is taking the allergy medications daily; cough is mostly non productive; wheezing \"everyday all day\"; tight restrictive, sometimes causes dry heaving. This is about the same as when he was seen in the office. During the day tends to do better as coughing less intense, around nightfall tends to get worse, when doing breathing treatments HR around 110, O2 levels hover around 92-93%; unable to carry on a conversation due to sob. Using xopenex. Advised to continue TID - QID.  Saturday had problems w/ bloating, took some lasix to relieve that, never showed as hand or foot edema. Weighs regularly, advised to weigh daily at the same time to track fluid; if weight gain of 2-3 pounds in 24 hours would be indicator for lasix dosing. Confirmed any dizziness has been associated w/ prolonged coughing episode, not after lasix dosing.     Scheduled for work in appt  1/31 w/ Dr. Jordan.

## 2025-01-29 NOTE — TELEPHONE ENCOUNTER
At conversation it was confirmed patient has finished steroids and advised if not able to control symptoms should report to ER.

## 2025-01-31 ENCOUNTER — CLINICAL DOCUMENTATION (OUTPATIENT)
Dept: PULMONOLOGY | Age: 60
End: 2025-01-31

## 2025-01-31 ENCOUNTER — OFFICE VISIT (OUTPATIENT)
Dept: PULMONOLOGY | Age: 60
End: 2025-01-31
Payer: COMMERCIAL

## 2025-01-31 ENCOUNTER — TELEPHONE (OUTPATIENT)
Dept: PULMONOLOGY | Age: 60
End: 2025-01-31

## 2025-01-31 VITALS
HEIGHT: 71 IN | SYSTOLIC BLOOD PRESSURE: 128 MMHG | BODY MASS INDEX: 28.77 KG/M2 | HEART RATE: 72 BPM | OXYGEN SATURATION: 95 % | TEMPERATURE: 97.5 F | RESPIRATION RATE: 18 BRPM | WEIGHT: 205.5 LBS | DIASTOLIC BLOOD PRESSURE: 76 MMHG

## 2025-01-31 DIAGNOSIS — J30.89 ENVIRONMENTAL AND SEASONAL ALLERGIES: ICD-10-CM

## 2025-01-31 DIAGNOSIS — R05.2 SUBACUTE COUGH: ICD-10-CM

## 2025-01-31 DIAGNOSIS — J45.51 SEVERE PERSISTENT ASTHMA WITH (ACUTE) EXACERBATION: ICD-10-CM

## 2025-01-31 DIAGNOSIS — J45.51 SEVERE PERSISTENT ASTHMA WITH (ACUTE) EXACERBATION: Primary | ICD-10-CM

## 2025-01-31 LAB
D DIMER PPP FEU-MCNC: <0.27 UG/ML(FEU)
EOSINOPHIL # BLD: 0.45 K/UL
RSV RNA NPH QL NAA+PROBE: NOT DETECTED
SARS-COV-2 RNA RESP QL NAA+PROBE: NOT DETECTED
SOURCE: NORMAL
SOURCE: NORMAL

## 2025-01-31 PROCEDURE — G2211 COMPLEX E/M VISIT ADD ON: HCPCS | Performed by: INTERNAL MEDICINE

## 2025-01-31 PROCEDURE — 99214 OFFICE O/P EST MOD 30 MIN: CPT | Performed by: INTERNAL MEDICINE

## 2025-01-31 RX ORDER — PREDNISONE 20 MG/1
TABLET ORAL
Qty: 24 TABLET | Refills: 0 | Status: SHIPPED | OUTPATIENT
Start: 2025-01-31

## 2025-01-31 RX ORDER — CHLOPHEDIANOL HCL AND PYRILAMINE MALEATE 12.5; 12.5 MG/5ML; MG/5ML
SOLUTION ORAL
Qty: 250 ML | Refills: 0 | Status: SHIPPED | OUTPATIENT
Start: 2025-01-31

## 2025-01-31 RX ORDER — BENRALIZUMAB 30 MG/ML
INJECTION, SOLUTION SUBCUTANEOUS
Qty: 1 ADJUSTABLE DOSE PRE-FILLED PEN SYRINGE | Refills: 8 | Status: SHIPPED | OUTPATIENT
Start: 2025-01-31 | End: 2026-04-30

## 2025-01-31 NOTE — TELEPHONE ENCOUNTER
New Enrollment for Fasenra    Patient received 1st dose in the office. Script was sent to [facility] for Benefits Investigation and authorization.

## 2025-01-31 NOTE — PROGRESS NOTES
No  Organic/Inorganic Dusts: Yes  Molds: Yes  Occupation/Hobbies: retired .  Immunization History   Administered Date(s) Administered    Influenza, FLUCELVAX, (age 6 mo+), MDCK, Quadv PF, 0.5mL 01/11/2018    TD 5LF, TENIVAC, (age 7y+), IM, 0.5mL 06/29/2016    TDaP, ADACEL (age 10y-64y), BOOSTRIX (age 10y+), IM, 0.5mL 10/13/2020, 02/28/2023     Past Medical History:   Diagnosis Date    Arthritis     OA    Chronic cough     Chronic pain      right shoulder    Injury 2/2014    reinjury to right shoulder    Moderate persistent asthma with exacerbation     followed by pulmonary    Prolonged emergence from general anesthesia     Rash     pt states has a heat rash        Tobacco Use      Smoking status: Never        Passive exposure: Never      Smokeless tobacco: Current    Allergies   Allergen Reactions    Penicillins Rash     Current Outpatient Medications   Medication Instructions    Benralizumab (FASENRA PEN) 30 MG/ML SOAJ Inject 1 mL into the skin every 28 days for 90 days, THEN 1 mL every 2 months.    cetirizine (ZYRTEC) 10 mg, Oral, DAILY    Chlophedianol-Pyrilamine (NINJACOF) 12.5-12.5 MG/5ML LIQD 2 teaspoon q6-8h, do not exceed 6 teaspoon in 24 hours    econazole nitrate 1 % cream DAILY    fluticasone (FLONASE) 50 MCG/ACT nasal spray SHAKE WELL AND USE 2 SPRAYS IN EACH NOSTRIL TWICE DAILY    fluticasone-umeclidin-vilant (TRELEGY ELLIPTA) 200-62.5-25 MCG/ACT AEPB inhaler 1 puff, Inhalation, DAILY    levalbuterol (XOPENEX HFA) 45 MCG/ACT inhaler 2 puffs, Inhalation, EVERY 4 HOURS PRN    levalbuterol (XOPENEX) 1.25 mg, Nebulization, EVERY 4 HOURS PRN    NAFTIN 2 % GEL DAILY    pantoprazole (PROTONIX) 40 mg, Oral, DAILY BEFORE BREAKFAST    predniSONE (DELTASONE) 10 MG tablet Take 4 tabs x 3 days then 3 tabs x 3 days then 2 tabs x 3 days then 1 tab x 3 days then stop.    predniSONE (DELTASONE) 10 MG tablet Take 4 tabs x 3 days then 3 tabs x 3 days then 2 tabs x 3 days then 1 tab x 3 days then stop.

## 2025-01-31 NOTE — PROGRESS NOTES
Yessy with [facility] called the office to confirm Covid and RSV panel or which one was needed. I confirmed with her that Provider said both she states that she's not sure she will have enough specimen for both but she will try. AMIRA MAGUIRE MA

## 2025-02-03 ENCOUNTER — TELEPHONE (OUTPATIENT)
Dept: PULMONOLOGY | Age: 60
End: 2025-02-03

## 2025-02-03 DIAGNOSIS — R05.3 CHRONIC COUGH: Primary | ICD-10-CM

## 2025-02-03 NOTE — TELEPHONE ENCOUNTER
Last seen: 1/31/25  Hx: asthma, environmental & seasonal allergies    Started Fasenra at last visit; has f/u appt 2/19, rx for steroid taper & antihistamine cough syrup for HS use.    Spouse calling to report severe coughing, so bad that patient cannot catch his breath, wheezing, cough is not productive.    Contacted spouse, starts with \"it is horrible\"; just got call from pharmacy that cough medication is available for pickup, reports that this is just not normal and she is very concerned about the amount of coughing that is overwhelming with any activity, including talking; was able to start prednisone noting this is the 3rd round, there is no difference in symptoms when taking prednisone. Patient is miserable, not able to sleep, dry, constant, can't-catch-breath cough.   Noted in office visit that patient feels better for about an hour the symptoms take over, this morning was different, woke with coughing and start breathing treatment, calms during treatment, coughs for a minute then stops, is shaking from treatment and drifts off to sleep for maybe 30 minutes then wakes coughing. Spouse exasperated as his discomfort asking for any recommendations.

## 2025-02-03 NOTE — TELEPHONE ENCOUNTER
Relayed to spouse MD recommendations.   Noted patient did neb treatment @ 1445, was able to get cough med today and is currently sleeping, want to try getting CT, if symptoms continue will report to ER.

## 2025-02-03 NOTE — TELEPHONE ENCOUNTER
TRIAGE CALL      Complaint: Coughing/coughing so bad he feels like he can not catch his breath  Cough: yes  Productive:  no  Bloody Sputum:  no  Increased SOB/Wheezing:  yes  Duration: 3 months  Fever/Chills: no  OTC Meds tried: zyrtec/flonase/cough medicine OTC

## 2025-02-19 ENCOUNTER — TELEMEDICINE (OUTPATIENT)
Dept: PULMONOLOGY | Age: 60
End: 2025-02-19
Payer: COMMERCIAL

## 2025-02-19 DIAGNOSIS — J30.89 ENVIRONMENTAL AND SEASONAL ALLERGIES: Primary | ICD-10-CM

## 2025-02-19 DIAGNOSIS — J45.50 SEVERE PERSISTENT ASTHMA WITHOUT COMPLICATION (HCC): ICD-10-CM

## 2025-02-19 PROCEDURE — 99214 OFFICE O/P EST MOD 30 MIN: CPT | Performed by: NURSE PRACTITIONER

## 2025-02-19 RX ORDER — PREDNISONE 20 MG/1
TABLET ORAL
Qty: 20 TABLET | Refills: 1 | Status: SHIPPED | OUTPATIENT
Start: 2025-02-19

## 2025-02-19 NOTE — PROGRESS NOTES
Name:  Ruy Douglas  YOB: 1965   MRN: [id number]      Office Visit: 2/19/2025   The patient is seen by synchronous (real-time) audio-video technology on Epic.     Consent:  The patient/healthcare decision maker is aware that this patient-initiated Telehealth encounter is a billable service, with coverage as determined by his insurance carrier. The patient is aware that he/she may receive a bill and has provided verbal consent to proceed: Yes     I was at my home in [facility] while conducting this visit.    We discussed the expected course, resolution and complications of the diagnosis(es) in detail.  Medication risks, benefits, costs, interactions, and alternatives were discussed as indicated.  I advised him to contact the office if his condition worsens, changes or fails to improve as anticipated. He expressed understanding with the diagnosis(es) and plan.     Pursuant to the emergency declaration under the [place] Act and the National Emergencies Act, 1135 waiver authority and the Coronavirus Preparedness and Response Supplemental Appropriations Act, this Virtual  Visit was conducted, with patient's consent, to reduce the patient's risk of exposure to COVID-19 and provide continuity of care for an established patient.     Services were provided through a video synchronous discussion virtually to substitute for in-person clinic visit.    Over 50% of today's office visit was spent in face to face time reviewing test results/records, prognosis, importance of compliance, education about disease process, benefits of medications, instructions for management of acute flare-ups, and follow up plans.  Total time spent was 10:03 minutes.       ASSESSMENT AND PLAN:  (Medical Decision Making)    Impression: 59 y.o. male retired  for 30 years, reporting wheezing, chronic cough for 3-months, shortness of breath    1. Severe Persistent Asthma without complication  --Would

## (undated) DEVICE — SYRINGE, LUER SLIP, STERILE, 60ML: Brand: MEDLINE

## (undated) DEVICE — CANNULA NSL ORAL AD FOR CAPNOFLEX CO2 O2 AIRLFE

## (undated) DEVICE — GAUZE,SPONGE,4"X4",12PLY,WOVEN,NS,LF: Brand: MEDLINE

## (undated) DEVICE — YANKAUER,BULB TIP,W/O VENT,RIGID,STERILE: Brand: MEDLINE

## (undated) DEVICE — ENDOSCOPIC KIT 1.1+ OP4 CA DE 2 GWN AAMI LEVEL 3

## (undated) DEVICE — CONNECTOR TBNG OD5-7MM O2 END DISP

## (undated) DEVICE — AIRLIFE™ OXYGEN TUBING 7 FEET (2.1 M) CRUSH RESISTANT OXYGEN TUBING, VINYL TIPPED: Brand: AIRLIFE™

## (undated) DEVICE — SYRINGE MED 10ML LUERLOCK TIP W/O SFTY DISP

## (undated) DEVICE — KENDALL RADIOLUCENT FOAM MONITORING ELECTRODE RECTANGULAR SHAPE: Brand: KENDALL

## (undated) DEVICE — SINGLE PORT MANIFOLD: Brand: NEPTUNE 2

## (undated) DEVICE — SYRINGE MEDICAL 3ML CLEAR PLASTIC STANDARD NON CONTROL LUERLOCK TIP DISPOSABLE